# Patient Record
Sex: FEMALE | Race: WHITE | Employment: FULL TIME | URBAN - METROPOLITAN AREA
[De-identification: names, ages, dates, MRNs, and addresses within clinical notes are randomized per-mention and may not be internally consistent; named-entity substitution may affect disease eponyms.]

---

## 2017-01-12 DIAGNOSIS — F33.40 RECURRENT MAJOR DEPRESSIVE DISORDER, IN REMISSION (HCC): ICD-10-CM

## 2017-01-12 RX ORDER — VENLAFAXINE HYDROCHLORIDE 75 MG/1
CAPSULE, EXTENDED RELEASE ORAL
Qty: 270 CAPSULE | Refills: 3 | Status: SHIPPED | OUTPATIENT
Start: 2017-01-12 | End: 2017-01-25 | Stop reason: CLARIF

## 2017-01-16 ENCOUNTER — TELEPHONE (OUTPATIENT)
Dept: FAMILY MEDICINE CLINIC | Age: 42
End: 2017-01-16

## 2017-01-25 ENCOUNTER — TELEPHONE (OUTPATIENT)
Dept: FAMILY MEDICINE CLINIC | Age: 42
End: 2017-01-25

## 2017-01-25 RX ORDER — VENLAFAXINE HYDROCHLORIDE 150 MG/1
150 CAPSULE, EXTENDED RELEASE ORAL DAILY
Qty: 30 CAPSULE | Refills: 3 | Status: SHIPPED | OUTPATIENT
Start: 2017-01-25 | End: 2017-05-23 | Stop reason: SDUPTHER

## 2017-01-25 RX ORDER — VENLAFAXINE HYDROCHLORIDE 75 MG/1
75 CAPSULE, EXTENDED RELEASE ORAL DAILY
Qty: 30 CAPSULE | Refills: 3 | Status: SHIPPED | OUTPATIENT
Start: 2017-01-25 | End: 2017-04-26 | Stop reason: SDUPTHER

## 2017-05-23 RX ORDER — VENLAFAXINE HYDROCHLORIDE 150 MG/1
CAPSULE, EXTENDED RELEASE ORAL
Qty: 30 CAPSULE | Refills: 1 | Status: SHIPPED | OUTPATIENT
Start: 2017-05-23 | End: 2017-07-23 | Stop reason: SDUPTHER

## 2017-07-24 RX ORDER — VENLAFAXINE HYDROCHLORIDE 150 MG/1
CAPSULE, EXTENDED RELEASE ORAL
Qty: 30 CAPSULE | Refills: 1 | Status: SHIPPED | OUTPATIENT
Start: 2017-07-24 | End: 2017-09-24 | Stop reason: SDUPTHER

## 2017-09-25 RX ORDER — VENLAFAXINE HYDROCHLORIDE 150 MG/1
CAPSULE, EXTENDED RELEASE ORAL
Qty: 30 CAPSULE | Refills: 0 | Status: SHIPPED | OUTPATIENT
Start: 2017-09-25 | End: 2017-10-13 | Stop reason: SDUPTHER

## 2017-10-13 ENCOUNTER — OFFICE VISIT (OUTPATIENT)
Dept: FAMILY MEDICINE CLINIC | Age: 42
End: 2017-10-13

## 2017-10-13 VITALS
TEMPERATURE: 98.7 F | WEIGHT: 162 LBS | HEIGHT: 63 IN | SYSTOLIC BLOOD PRESSURE: 104 MMHG | DIASTOLIC BLOOD PRESSURE: 62 MMHG | BODY MASS INDEX: 28.7 KG/M2

## 2017-10-13 DIAGNOSIS — H92.02 EAR PAIN, LEFT: ICD-10-CM

## 2017-10-13 DIAGNOSIS — F33.40 RECURRENT MAJOR DEPRESSIVE DISORDER, IN REMISSION (HCC): Primary | ICD-10-CM

## 2017-10-13 DIAGNOSIS — H60.392 INFECTION OF LEFT EARLOBE: ICD-10-CM

## 2017-10-13 PROCEDURE — 99214 OFFICE O/P EST MOD 30 MIN: CPT | Performed by: FAMILY MEDICINE

## 2017-10-13 RX ORDER — VENLAFAXINE HYDROCHLORIDE 150 MG/1
CAPSULE, EXTENDED RELEASE ORAL
Qty: 90 CAPSULE | Refills: 1 | Status: SHIPPED | OUTPATIENT
Start: 2017-10-13 | End: 2018-05-10 | Stop reason: SDUPTHER

## 2017-10-13 RX ORDER — SULFAMETHOXAZOLE AND TRIMETHOPRIM 800; 160 MG/1; MG/1
1 TABLET ORAL 2 TIMES DAILY
Qty: 14 TABLET | Refills: 0 | Status: SHIPPED | OUTPATIENT
Start: 2017-10-13 | End: 2017-10-20

## 2017-10-13 RX ORDER — VENLAFAXINE HYDROCHLORIDE 75 MG/1
CAPSULE, EXTENDED RELEASE ORAL
Qty: 90 CAPSULE | Refills: 1 | Status: SHIPPED | OUTPATIENT
Start: 2017-10-13 | End: 2018-05-10 | Stop reason: SDUPTHER

## 2017-10-13 ASSESSMENT — PATIENT HEALTH QUESTIONNAIRE - PHQ9
SUM OF ALL RESPONSES TO PHQ9 QUESTIONS 1 & 2: 0
1. LITTLE INTEREST OR PLEASURE IN DOING THINGS: 0
2. FEELING DOWN, DEPRESSED OR HOPELESS: 0
SUM OF ALL RESPONSES TO PHQ QUESTIONS 1-9: 0

## 2017-10-13 NOTE — PROGRESS NOTES
Noah Dewey is a 36 y.o. female    Chief Complaint   Patient presents with    Depression     med follow up     Ear Drainage    Otalgia     left ear        HPI:    Otalgia    There is pain in the left ear. This is a recurrent problem. The current episode started 1 to 4 weeks ago. The problem has been gradually worsening. There has been no fever. She has tried NSAIDs for the symptoms. The treatment provided moderate relief. There is no history of hearing loss. Patient has pain in her left earlobe. She occasionally wears earrings. Depression. This is a chronic condition. Her depression is well controlled. Patient has been on Effexor for several years. She takes both 75 mg 150 mg capsules. She used to see a psychiatrist but wanted to see the primary care physician. ROS:    Review of Systems   Constitutional: Negative for fever. HENT: Positive for ear pain. Psychiatric/Behavioral: Negative for depression (controlled with Effexor). /62 (Site: Left Arm, Position: Sitting, Cuff Size: Small Adult)   Temp 98.7 °F (37.1 °C) (Oral)   Ht 5' 3.39\" (1.61 m) Comment: without shoes  Wt 162 lb (73.5 kg) Comment: without out shoes  LMP 09/14/2017   Breastfeeding? No   BMI 28.35 kg/m²     Physical Exam:    Physical Exam   Constitutional: She appears well-developed. No distress. HENT:   Right Ear: Tympanic membrane normal.   Left Ear: Tympanic membrane normal. Tympanic membrane is not erythematous. Mild erythema to her left earlobe. Minimal TTP upon squeezing the left earlobe. Skin: She is not diaphoretic. Psychiatric: She has a normal mood and affect.  Her behavior is normal.       Current Outpatient Prescriptions   Medication Sig Dispense Refill    venlafaxine (EFFEXOR XR) 75 MG extended release capsule TAKE 1 CAPSULE BY MOUTH DAILY (ALONG WITH 150 MG CAPSULE) 90 capsule 1    venlafaxine (EFFEXOR XR) 150 MG extended release capsule TAKE 1 CAPSULE BY MOUTH DAILY (ALONG WITH 75 MG CAPSULE) 90 capsule 1    sulfamethoxazole-trimethoprim (BACTRIM DS) 800-160 MG per tablet Take 1 tablet by mouth 2 times daily for 7 days 14 tablet 0     No current facility-administered medications for this visit. Assessment:    1. Recurrent major depressive disorder, in remission ([facility] [address]    2. Infection of left earlobe    3. Ear pain, left        Plan:    1. Recurrent major depressive disorder, in remission ([facility] [address]  Stable. Continue current medications. - venlafaxine (EFFEXOR XR) 75 MG extended release capsule; TAKE 1 CAPSULE BY MOUTH DAILY (ALONG WITH 150 MG CAPSULE)  Dispense: 90 capsule; Refill: 1  - venlafaxine (EFFEXOR XR) 150 MG extended release capsule; TAKE 1 CAPSULE BY MOUTH DAILY (ALONG WITH 75 MG CAPSULE)  Dispense: 90 capsule; Refill: 1    2. Infection of left earlobe  Try applying ice TID. If no improvement, take Bactrim. May need to see ENT if she feels abscess is developing but I doubt that is the case currently. - sulfamethoxazole-trimethoprim (BACTRIM DS) 800-160 MG per tablet; Take 1 tablet by mouth 2 times daily for 7 days  Dispense: 14 tablet; Refill: 0    3. Ear pain, left  Inner ear looks good. Recommend continuing ibuprofen prn pain. Return in about 6 months (around 4/13/2018) for Preventative.

## 2018-05-10 DIAGNOSIS — F33.40 RECURRENT MAJOR DEPRESSIVE DISORDER, IN REMISSION (HCC): ICD-10-CM

## 2018-05-10 RX ORDER — VENLAFAXINE HYDROCHLORIDE 150 MG/1
CAPSULE, EXTENDED RELEASE ORAL
Qty: 90 CAPSULE | Refills: 1 | Status: SHIPPED | OUTPATIENT
Start: 2018-05-10 | End: 2018-11-07 | Stop reason: SDUPTHER

## 2018-05-10 RX ORDER — VENLAFAXINE HYDROCHLORIDE 75 MG/1
CAPSULE, EXTENDED RELEASE ORAL
Qty: 90 CAPSULE | Refills: 1 | Status: SHIPPED | OUTPATIENT
Start: 2018-05-10 | End: 2018-11-07 | Stop reason: SDUPTHER

## 2018-11-07 DIAGNOSIS — F33.40 RECURRENT MAJOR DEPRESSIVE DISORDER, IN REMISSION (HCC): ICD-10-CM

## 2018-11-09 RX ORDER — VENLAFAXINE HYDROCHLORIDE 75 MG/1
CAPSULE, EXTENDED RELEASE ORAL
Qty: 90 CAPSULE | Refills: 1 | Status: SHIPPED | OUTPATIENT
Start: 2018-11-09 | End: 2019-05-13 | Stop reason: SDUPTHER

## 2018-11-09 RX ORDER — VENLAFAXINE HYDROCHLORIDE 150 MG/1
CAPSULE, EXTENDED RELEASE ORAL
Qty: 90 CAPSULE | Refills: 1 | Status: SHIPPED | OUTPATIENT
Start: 2018-11-09 | End: 2019-05-13 | Stop reason: SDUPTHER

## 2019-05-13 DIAGNOSIS — F33.40 RECURRENT MAJOR DEPRESSIVE DISORDER, IN REMISSION (HCC): ICD-10-CM

## 2019-05-14 RX ORDER — VENLAFAXINE HYDROCHLORIDE 150 MG/1
CAPSULE, EXTENDED RELEASE ORAL
Qty: 90 CAPSULE | Refills: 1 | Status: SHIPPED | OUTPATIENT
Start: 2019-05-14 | End: 2019-12-12 | Stop reason: SDUPTHER

## 2019-05-14 RX ORDER — VENLAFAXINE HYDROCHLORIDE 75 MG/1
CAPSULE, EXTENDED RELEASE ORAL
Qty: 90 CAPSULE | Refills: 1 | Status: SHIPPED | OUTPATIENT
Start: 2019-05-14 | End: 2019-12-12 | Stop reason: SDUPTHER

## 2019-12-12 ENCOUNTER — OFFICE VISIT (OUTPATIENT)
Dept: FAMILY MEDICINE CLINIC | Age: 44
End: 2019-12-12
Payer: COMMERCIAL

## 2019-12-12 VITALS
WEIGHT: 177 LBS | HEART RATE: 64 BPM | SYSTOLIC BLOOD PRESSURE: 118 MMHG | BODY MASS INDEX: 30.22 KG/M2 | RESPIRATION RATE: 16 BRPM | HEIGHT: 64 IN | DIASTOLIC BLOOD PRESSURE: 70 MMHG | OXYGEN SATURATION: 98 %

## 2019-12-12 DIAGNOSIS — F32.A DEPRESSION, UNSPECIFIED DEPRESSION TYPE: ICD-10-CM

## 2019-12-12 DIAGNOSIS — Z00.00 PHYSICAL EXAM: ICD-10-CM

## 2019-12-12 DIAGNOSIS — Z23 NEED FOR INFLUENZA VACCINATION: ICD-10-CM

## 2019-12-12 DIAGNOSIS — Z12.39 SCREENING FOR BREAST CANCER: ICD-10-CM

## 2019-12-12 DIAGNOSIS — F40.10 SOCIAL ANXIETY DISORDER: ICD-10-CM

## 2019-12-12 DIAGNOSIS — E66.09 CLASS 1 OBESITY DUE TO EXCESS CALORIES WITHOUT SERIOUS COMORBIDITY WITH BODY MASS INDEX (BMI) OF 30.0 TO 30.9 IN ADULT: ICD-10-CM

## 2019-12-12 PROCEDURE — 90686 IIV4 VACC NO PRSV 0.5 ML IM: CPT | Performed by: NURSE PRACTITIONER

## 2019-12-12 PROCEDURE — 99396 PREV VISIT EST AGE 40-64: CPT | Performed by: NURSE PRACTITIONER

## 2019-12-12 PROCEDURE — 90471 IMMUNIZATION ADMIN: CPT | Performed by: NURSE PRACTITIONER

## 2019-12-12 RX ORDER — VENLAFAXINE HYDROCHLORIDE 150 MG/1
150 CAPSULE, EXTENDED RELEASE ORAL DAILY
Qty: 90 CAPSULE | Refills: 0 | Status: SHIPPED | OUTPATIENT
Start: 2019-12-12 | End: 2020-01-02 | Stop reason: SDUPTHER

## 2019-12-12 RX ORDER — VENLAFAXINE HYDROCHLORIDE 75 MG/1
75 CAPSULE, EXTENDED RELEASE ORAL DAILY
Qty: 90 CAPSULE | Refills: 0 | Status: SHIPPED | OUTPATIENT
Start: 2019-12-12 | End: 2020-01-02 | Stop reason: SDUPTHER

## 2019-12-12 RX ORDER — BUSPIRONE HYDROCHLORIDE 7.5 MG/1
7.5 TABLET ORAL 2 TIMES DAILY
Qty: 60 TABLET | Refills: 0 | Status: SHIPPED | OUTPATIENT
Start: 2019-12-12 | End: 2020-01-08 | Stop reason: SDUPTHER

## 2019-12-12 ASSESSMENT — PATIENT HEALTH QUESTIONNAIRE - PHQ9
SUM OF ALL RESPONSES TO PHQ QUESTIONS 1-9: 2
2. FEELING DOWN, DEPRESSED OR HOPELESS: 1
SUM OF ALL RESPONSES TO PHQ QUESTIONS 1-9: 2
SUM OF ALL RESPONSES TO PHQ9 QUESTIONS 1 & 2: 2
1. LITTLE INTEREST OR PLEASURE IN DOING THINGS: 1

## 2019-12-23 ENCOUNTER — TELEPHONE (OUTPATIENT)
Dept: FAMILY MEDICINE CLINIC | Age: 44
End: 2019-12-23

## 2020-01-02 ENCOUNTER — OFFICE VISIT (OUTPATIENT)
Dept: FAMILY MEDICINE CLINIC | Age: 45
End: 2020-01-02
Payer: COMMERCIAL

## 2020-01-02 VITALS
DIASTOLIC BLOOD PRESSURE: 82 MMHG | SYSTOLIC BLOOD PRESSURE: 122 MMHG | HEART RATE: 68 BPM | WEIGHT: 177 LBS | BODY MASS INDEX: 30.38 KG/M2 | OXYGEN SATURATION: 98 %

## 2020-01-02 PROCEDURE — 99213 OFFICE O/P EST LOW 20 MIN: CPT | Performed by: FAMILY MEDICINE

## 2020-01-02 RX ORDER — VENLAFAXINE HYDROCHLORIDE 150 MG/1
150 CAPSULE, EXTENDED RELEASE ORAL DAILY
Qty: 90 CAPSULE | Refills: 0 | Status: SHIPPED | OUTPATIENT
Start: 2020-01-02 | End: 2020-06-01

## 2020-01-02 RX ORDER — VENLAFAXINE HYDROCHLORIDE 75 MG/1
75 CAPSULE, EXTENDED RELEASE ORAL DAILY
Qty: 90 CAPSULE | Refills: 0 | Status: SHIPPED | OUTPATIENT
Start: 2020-01-02 | End: 2020-06-01

## 2020-01-09 RX ORDER — BUSPIRONE HYDROCHLORIDE 7.5 MG/1
7.5 TABLET ORAL 2 TIMES DAILY PRN
Qty: 60 TABLET | Refills: 1 | Status: SHIPPED | OUTPATIENT
Start: 2020-01-09 | End: 2020-01-24 | Stop reason: SDUPTHER

## 2020-01-24 RX ORDER — BUSPIRONE HYDROCHLORIDE 7.5 MG/1
7.5 TABLET ORAL 2 TIMES DAILY PRN
Qty: 180 TABLET | Refills: 1 | Status: SHIPPED | OUTPATIENT
Start: 2020-01-24 | End: 2020-08-13

## 2020-01-27 ENCOUNTER — HOSPITAL ENCOUNTER (OUTPATIENT)
Dept: WOMENS IMAGING | Age: 45
Discharge: HOME | End: 2020-01-27
Payer: COMMERCIAL

## 2020-01-27 PROCEDURE — 77063 BREAST TOMOSYNTHESIS BI: CPT

## 2020-06-01 RX ORDER — VENLAFAXINE HYDROCHLORIDE 75 MG/1
CAPSULE, EXTENDED RELEASE ORAL
Qty: 90 CAPSULE | Refills: 0 | Status: SHIPPED | OUTPATIENT
Start: 2020-06-01 | End: 2021-11-29 | Stop reason: SDUPTHER

## 2020-06-01 RX ORDER — VENLAFAXINE HYDROCHLORIDE 150 MG/1
CAPSULE, EXTENDED RELEASE ORAL
Qty: 90 CAPSULE | Refills: 0 | Status: SHIPPED | OUTPATIENT
Start: 2020-06-01 | End: 2020-09-08 | Stop reason: SDUPTHER

## 2020-06-01 NOTE — TELEPHONE ENCOUNTER
.  Last office visit 1/2/2020     Last written 1/2/20 #90 no refills    Next office visit scheduled 7/6/2020    Requested Prescriptions     Pending Prescriptions Disp Refills    venlafaxine (EFFEXOR XR) 75 MG extended release capsule [Pharmacy Med Name: VENLAFAXINE HCL ER 75 MG CAP] 90 capsule 0     Sig: TAKE 1 CAPSULE BY MOUTH EVERY DAY

## 2020-08-13 RX ORDER — BUSPIRONE HYDROCHLORIDE 7.5 MG/1
7.5 TABLET ORAL 2 TIMES DAILY PRN
Qty: 180 TABLET | Refills: 1 | Status: SHIPPED | OUTPATIENT
Start: 2020-08-13 | End: 2020-08-17

## 2020-08-17 ENCOUNTER — APPOINTMENT (OUTPATIENT)
Dept: CT IMAGING | Age: 45
End: 2020-08-17
Payer: COMMERCIAL

## 2020-08-17 ENCOUNTER — HOSPITAL ENCOUNTER (EMERGENCY)
Age: 45
Discharge: HOME | End: 2020-08-17
Attending: EMERGENCY MEDICINE
Payer: COMMERCIAL

## 2020-08-17 ENCOUNTER — APPOINTMENT (OUTPATIENT)
Dept: GENERAL RADIOLOGY | Age: 45
End: 2020-08-17
Payer: COMMERCIAL

## 2020-08-17 VITALS
RESPIRATION RATE: 12 BRPM | OXYGEN SATURATION: 97 % | HEART RATE: 67 BPM | WEIGHT: 170 LBS | SYSTOLIC BLOOD PRESSURE: 102 MMHG | DIASTOLIC BLOOD PRESSURE: 67 MMHG | TEMPERATURE: 98.6 F | BODY MASS INDEX: 30.12 KG/M2 | HEIGHT: 63 IN

## 2020-08-17 LAB
A/G RATIO: 1.9 (ref 1.1–2.2)
ALBUMIN SERPL-MCNC: 4.5 G/DL (ref 3.4–5)
ALP BLD-CCNC: 95 U/L (ref 40–129)
ALT SERPL-CCNC: 82 U/L (ref 10–40)
ANION GAP SERPL CALCULATED.3IONS-SCNC: 17 MMOL/L (ref 3–16)
AST SERPL-CCNC: 76 U/L (ref 15–37)
BASOPHILS ABSOLUTE: 0 K/UL (ref 0–0.2)
BASOPHILS RELATIVE PERCENT: 0.9 %
BILIRUB SERPL-MCNC: 0.3 MG/DL (ref 0–1)
BUN BLDV-MCNC: 11 MG/DL (ref 7–20)
CALCIUM SERPL-MCNC: 9.3 MG/DL (ref 8.3–10.6)
CHLORIDE BLD-SCNC: 99 MMOL/L (ref 99–110)
CO2: 20 MMOL/L (ref 21–32)
CREAT SERPL-MCNC: 0.6 MG/DL (ref 0.6–1.1)
EKG ATRIAL RATE: 76 BPM
EKG DIAGNOSIS: NORMAL
EKG P AXIS: 44 DEGREES
EKG P-R INTERVAL: 146 MS
EKG Q-T INTERVAL: 410 MS
EKG QRS DURATION: 90 MS
EKG QTC CALCULATION (BAZETT): 461 MS
EKG R AXIS: 60 DEGREES
EKG T AXIS: 21 DEGREES
EKG VENTRICULAR RATE: 76 BPM
EOSINOPHILS ABSOLUTE: 0 K/UL (ref 0–0.6)
EOSINOPHILS RELATIVE PERCENT: 0.8 %
GFR AFRICAN AMERICAN: >60
GFR NON-AFRICAN AMERICAN: >60
GLOBULIN: 2.4 G/DL
GLUCOSE BLD-MCNC: 97 MG/DL (ref 70–99)
HCG QUALITATIVE: NEGATIVE
HCT VFR BLD CALC: 39.1 % (ref 36–48)
HEMOGLOBIN: 13.4 G/DL (ref 12–16)
LYMPHOCYTES ABSOLUTE: 1.4 K/UL (ref 1–5.1)
LYMPHOCYTES RELATIVE PERCENT: 33.8 %
MCH RBC QN AUTO: 29.4 PG (ref 26–34)
MCHC RBC AUTO-ENTMCNC: 34.3 G/DL (ref 31–36)
MCV RBC AUTO: 85.9 FL (ref 80–100)
MONOCYTES ABSOLUTE: 0.3 K/UL (ref 0–1.3)
MONOCYTES RELATIVE PERCENT: 8.1 %
NEUTROPHILS ABSOLUTE: 2.4 K/UL (ref 1.7–7.7)
NEUTROPHILS RELATIVE PERCENT: 56.4 %
PDW BLD-RTO: 14.1 % (ref 12.4–15.4)
PLATELET # BLD: 153 K/UL (ref 135–450)
PLATELET SLIDE REVIEW: ADEQUATE
PMV BLD AUTO: 10.5 FL (ref 5–10.5)
POTASSIUM SERPL-SCNC: 4 MMOL/L (ref 3.5–5.1)
RBC # BLD: 4.55 M/UL (ref 4–5.2)
SLIDE REVIEW: NORMAL
SODIUM BLD-SCNC: 136 MMOL/L (ref 136–145)
TOTAL PROTEIN: 6.9 G/DL (ref 6.4–8.2)
TROPONIN: <0.01 NG/ML
WBC # BLD: 4.2 K/UL (ref 4–11)

## 2020-08-17 PROCEDURE — 80053 COMPREHEN METABOLIC PANEL: CPT

## 2020-08-17 PROCEDURE — 6360000002 HC RX W HCPCS: Performed by: EMERGENCY MEDICINE

## 2020-08-17 PROCEDURE — 70450 CT HEAD/BRAIN W/O DYE: CPT

## 2020-08-17 PROCEDURE — 71045 X-RAY EXAM CHEST 1 VIEW: CPT

## 2020-08-17 PROCEDURE — 96372 THER/PROPH/DIAG INJ SC/IM: CPT

## 2020-08-17 PROCEDURE — 6370000000 HC RX 637 (ALT 250 FOR IP): Performed by: EMERGENCY MEDICINE

## 2020-08-17 PROCEDURE — 99284 EMERGENCY DEPT VISIT MOD MDM: CPT

## 2020-08-17 PROCEDURE — 93005 ELECTROCARDIOGRAM TRACING: CPT | Performed by: EMERGENCY MEDICINE

## 2020-08-17 PROCEDURE — 84703 CHORIONIC GONADOTROPIN ASSAY: CPT

## 2020-08-17 PROCEDURE — 85025 COMPLETE CBC W/AUTO DIFF WBC: CPT

## 2020-08-17 PROCEDURE — 84484 ASSAY OF TROPONIN QUANT: CPT

## 2020-08-17 RX ORDER — BUTALBITAL, ACETAMINOPHEN AND CAFFEINE 50; 325; 40 MG/1; MG/1; MG/1
1 TABLET ORAL EVERY 4 HOURS PRN
Qty: 30 TABLET | Refills: 0 | Status: SHIPPED | OUTPATIENT
Start: 2020-08-17 | End: 2021-11-29 | Stop reason: ALTCHOICE

## 2020-08-17 RX ORDER — METOCLOPRAMIDE HYDROCHLORIDE 5 MG/ML
10 INJECTION INTRAMUSCULAR; INTRAVENOUS ONCE
Status: DISCONTINUED | OUTPATIENT
Start: 2020-08-17 | End: 2020-08-17

## 2020-08-17 RX ORDER — ONDANSETRON 4 MG/1
4 TABLET, ORALLY DISINTEGRATING ORAL ONCE
Status: COMPLETED | OUTPATIENT
Start: 2020-08-17 | End: 2020-08-17

## 2020-08-17 RX ORDER — 0.9 % SODIUM CHLORIDE 0.9 %
1000 INTRAVENOUS SOLUTION INTRAVENOUS ONCE
Status: DISCONTINUED | OUTPATIENT
Start: 2020-08-17 | End: 2020-08-17

## 2020-08-17 RX ORDER — SUMATRIPTAN 6 MG/.5ML
6 INJECTION, SOLUTION SUBCUTANEOUS ONCE
Status: COMPLETED | OUTPATIENT
Start: 2020-08-17 | End: 2020-08-17

## 2020-08-17 RX ORDER — ONDANSETRON 2 MG/ML
4 INJECTION INTRAMUSCULAR; INTRAVENOUS ONCE
Status: DISCONTINUED | OUTPATIENT
Start: 2020-08-17 | End: 2020-08-17

## 2020-08-17 RX ORDER — BUTALBITAL, ACETAMINOPHEN AND CAFFEINE 50; 325; 40 MG/1; MG/1; MG/1
1 TABLET ORAL EVERY 4 HOURS PRN
Status: DISCONTINUED | OUTPATIENT
Start: 2020-08-17 | End: 2020-08-17

## 2020-08-17 RX ORDER — METOCLOPRAMIDE HYDROCHLORIDE 5 MG/ML
10 INJECTION INTRAMUSCULAR; INTRAVENOUS ONCE
Status: COMPLETED | OUTPATIENT
Start: 2020-08-17 | End: 2020-08-17

## 2020-08-17 RX ORDER — ACETAMINOPHEN 500 MG
1000 TABLET ORAL ONCE
Status: COMPLETED | OUTPATIENT
Start: 2020-08-17 | End: 2020-08-17

## 2020-08-17 RX ORDER — IBUPROFEN 400 MG/1
400 TABLET ORAL ONCE
Status: COMPLETED | OUTPATIENT
Start: 2020-08-17 | End: 2020-08-17

## 2020-08-17 RX ORDER — ONDANSETRON 4 MG/1
TABLET, ORALLY DISINTEGRATING ORAL
Status: DISCONTINUED
Start: 2020-08-17 | End: 2020-08-17 | Stop reason: HOSPADM

## 2020-08-17 RX ORDER — KETOROLAC TROMETHAMINE 30 MG/ML
30 INJECTION, SOLUTION INTRAMUSCULAR; INTRAVENOUS ONCE
Status: DISCONTINUED | OUTPATIENT
Start: 2020-08-17 | End: 2020-08-17

## 2020-08-17 RX ORDER — IBUPROFEN 400 MG/1
400 TABLET ORAL EVERY 6 HOURS PRN
Qty: 20 TABLET | Refills: 0 | Status: SHIPPED | OUTPATIENT
Start: 2020-08-17

## 2020-08-17 RX ORDER — METOCLOPRAMIDE 10 MG/1
10 TABLET ORAL 3 TIMES DAILY PRN
Qty: 20 TABLET | Refills: 0 | Status: SHIPPED | OUTPATIENT
Start: 2020-08-17 | End: 2021-11-29 | Stop reason: ALTCHOICE

## 2020-08-17 RX ADMIN — SUMATRIPTAN 6 MG: 6 INJECTION SUBCUTANEOUS at 05:42

## 2020-08-17 RX ADMIN — METOCLOPRAMIDE HYDROCHLORIDE 10 MG: 5 INJECTION INTRAMUSCULAR; INTRAVENOUS at 05:00

## 2020-08-17 RX ADMIN — IBUPROFEN 400 MG: 400 TABLET, FILM COATED ORAL at 05:42

## 2020-08-17 RX ADMIN — ONDANSETRON 4 MG: 4 TABLET, ORALLY DISINTEGRATING ORAL at 03:32

## 2020-08-17 RX ADMIN — ACETAMINOPHEN 1000 MG: 500 TABLET ORAL at 03:32

## 2020-08-17 ASSESSMENT — PAIN DESCRIPTION - PAIN TYPE
TYPE: ACUTE PAIN
TYPE: ACUTE PAIN

## 2020-08-17 ASSESSMENT — PAIN SCALES - GENERAL
PAINLEVEL_OUTOF10: 2
PAINLEVEL_OUTOF10: 2
PAINLEVEL_OUTOF10: 5
PAINLEVEL_OUTOF10: 2

## 2020-08-17 ASSESSMENT — PAIN DESCRIPTION - LOCATION: LOCATION: CHEST

## 2020-08-17 NOTE — ED PROVIDER NOTES
[facility]  ED  [facility]      Pt Name: Fabian Stoll  MRN: [id number]  [place] 1975  Date of evaluation: 8/17/2020  Provider: Javier Townsend MD    CHIEF COMPLAINT       Chief Complaint   Patient presents with    Nausea     Has been dealing with a sinus issue for a week now about 3 days ago started dry heaving, tonight when she was dry heaving she started getting shooting pain across her chest, SOB, anxious at this time. HISTORY OF PRESENT ILLNESS   (Location/Symptom, Timing/Onset,Context/Setting, Quality, Duration, Modifying Factors, Severity)  Note limiting factors. Fabian Stoll is a 54 y.o. female who presents to the emergency department for throbbing frontal headache. Started approximately 5 days ago. Initially started as sinus symptoms including congestion which is since improved. The headache has persisted. The headache is been constant for the past week. Not associated with any neck stiff neck or neck pain. No fevers. It is not the worst headache in her life although she does not have frequent headaches. 3 days ago she started having nausea and dry heaving. Tonight when she coughed or sneezed she felt a burning pain across her epigastric region radiating to the back so that scared her and she decided to come to the emergency room. She feels very anxious. She does have a history of anxiety. The headache is rated at a 4/10. It is not worsened by light or sound. Nursing notes were reviewed. REVIEW OF SYSTEMS    (2-9 systems for level 4, 10 or more for level 5)     Review of Systems    Positive and pertinent negative as per HPI. Except as noted above in the ROS, all other systems were reviewed and were negative.     PAST MEDICAL HISTORY     Past Medical History:   Diagnosis Date    Anxiety     Depression     Strep sore throat          SURGICALHISTORY       Past Surgical History:   Procedure Laterality Date    BLADDER Narrative    Walks the dog leisurely pace, healthy diet, walks on treadmill       SCREENINGS             PHYSICAL EXAM    (up to 7 for level 4, 8 or more for level 5)     ED Triage Vitals [08/17/20 0144]   BP Temp Temp Source Pulse Resp SpO2 Height Weight   117/83 98.4 °F (36.9 °C) Oral 73 22 100 % 5' 3\" (1.6 m) 170 lb (77.1 kg)       Physical Exam  Vitals signs and nursing note reviewed. Constitutional:       Appearance: Normal appearance. She is well-developed. She is not ill-appearing. HENT:      Head: Normocephalic and atraumatic. Right Ear: External ear normal.      Left Ear: External ear normal.      Nose: Nose normal. No congestion or rhinorrhea. Mouth/Throat:      Mouth: Mucous membranes are moist.   Eyes:      General: No scleral icterus. Right eye: No discharge. Left eye: No discharge. Conjunctiva/sclera: Conjunctivae normal.   Neck:      Musculoskeletal: Neck supple. No neck rigidity or muscular tenderness. Cardiovascular:      Rate and Rhythm: Normal rate and regular rhythm. Heart sounds: Normal heart sounds. Pulmonary:      Effort: Pulmonary effort is normal. No respiratory distress. Breath sounds: Normal breath sounds. No wheezing or rales. Abdominal:      General: Bowel sounds are normal. There is no distension. Palpations: Abdomen is soft. Tenderness: There is no abdominal tenderness. Skin:     Coloration: Skin is not pale. Neurological:      General: No focal deficit present. Mental Status: She is alert. Psychiatric:      Comments: Anxious, tearful             DIAGNOSTIC RESULTS     EKG: All EKG's are interpreted by the Emergency Department Physician who either signs or Co-signs this chart in the absence of a cardiologist.    12 lead EKG shows Normal sinus rhythm, NH interval QRS QTC normal.  Normal axis. No acute ischemic changes. No previous for comparison.     RADIOLOGY:   Non-plain film images such as CT, Ultrasound and MRI [address]  [facility]  [phone]    Schedule an appointment as soon as possible for a visit         DISCHARGEMEDICATIONS:  New Prescriptions    BUTALBITAL-ACETAMINOPHEN-CAFFEINE (FIORICET, ESGIC) -40 MG PER TABLET    Take 1 tablet by mouth every 4 hours as needed for Headaches    IBUPROFEN (ADVIL;MOTRIN) 400 MG TABLET    Take 1 tablet by mouth every 6 hours as needed for Pain    METOCLOPRAMIDE (REGLAN) 10 MG TABLET    Take 1 tablet by mouth 3 times daily as needed (nausea)          (Please note that portions of this note were completed with a voice recognition program.  Efforts were made to edit the dictations but occasionally words are mis-transcribed.)    Alyssia Potter MD (electronically signed)  Attending Emergency Physician        Alyssia Potter MD  08/17/20 8922

## 2020-09-08 RX ORDER — VENLAFAXINE HYDROCHLORIDE 150 MG/1
CAPSULE, EXTENDED RELEASE ORAL
Qty: 90 CAPSULE | Refills: 0 | Status: SHIPPED | OUTPATIENT
Start: 2020-09-08 | End: 2020-12-08 | Stop reason: SDUPTHER

## 2020-09-08 NOTE — TELEPHONE ENCOUNTER
Last office visit 1/2/2020     Last written     Next office visit scheduled None scheduled.       Requested Prescriptions     Pending Prescriptions Disp Refills    venlafaxine (EFFEXOR XR) 150 MG extended release capsule 90 capsule 0     Sig: TAKE 1 CAPSULE BY MOUTH EVERY DAY

## 2020-12-07 NOTE — TELEPHONE ENCOUNTER
Last office visit 1/2/2020     Last written 9-8-2020    Next office visit scheduled  The scheduled    Requested Prescriptions     Pending Prescriptions Disp Refills    venlafaxine (EFFEXOR XR) 150 MG extended release capsule 90 capsule 0     Sig: TAKE 1 CAPSULE BY MOUTH EVERY DAY

## 2020-12-08 RX ORDER — VENLAFAXINE HYDROCHLORIDE 150 MG/1
CAPSULE, EXTENDED RELEASE ORAL
Qty: 90 CAPSULE | Refills: 0 | Status: SHIPPED | OUTPATIENT
Start: 2020-12-08 | End: 2021-03-09

## 2021-03-08 NOTE — TELEPHONE ENCOUNTER
Refill Request     Last Seen: 1/2/2020    Last Written: 12/08/2020 #90 with 0 refills     Next Appointment:   No future appointments.     Patient was due for an appointment 07/02/2020     Requested Prescriptions     Pending Prescriptions Disp Refills    venlafaxine (EFFEXOR XR) 150 MG extended release capsule [Pharmacy Med Name: VENLAFAXINE HCL  MG CAP] 90 capsule 0     Sig: TAKE 1 CAPSULE BY MOUTH EVERY DAY

## 2021-03-09 RX ORDER — VENLAFAXINE HYDROCHLORIDE 150 MG/1
CAPSULE, EXTENDED RELEASE ORAL
Qty: 90 CAPSULE | Refills: 0 | Status: SHIPPED | OUTPATIENT
Start: 2021-03-09 | End: 2021-07-14

## 2021-03-29 ENCOUNTER — TELEPHONE (OUTPATIENT)
Dept: WOMENS IMAGING | Age: 46
End: 2021-03-29

## 2021-03-29 NOTE — TELEPHONE ENCOUNTER
Left a message for the patient- trying to schedule an appointment for OVERDUE screening mammogram. Phone number was provided- patient to contact scheduling.

## 2021-07-14 RX ORDER — VENLAFAXINE HYDROCHLORIDE 150 MG/1
CAPSULE, EXTENDED RELEASE ORAL
Qty: 90 CAPSULE | Refills: 0 | Status: SHIPPED | OUTPATIENT
Start: 2021-07-14 | End: 2021-10-18 | Stop reason: SDUPTHER

## 2021-07-14 NOTE — TELEPHONE ENCOUNTER
Please schedule. Last note: Return in about 6 months (around 7/2/2020) for Mood disorder.  Thanks, Melba Look

## 2021-07-14 NOTE — TELEPHONE ENCOUNTER
Refill Request     Last Seen: Last Seen Department: 1/2/2020  Last Seen by PCP: 1/2/2020    Last Written: 3/9/2021    Next Appointment:   No future appointments.         Requested Prescriptions     Pending Prescriptions Disp Refills    venlafaxine (EFFEXOR XR) 150 MG extended release capsule [Pharmacy Med Name: VENLAFAXINE HCL  MG CAP] 90 capsule 0     Sig: TAKE 1 CAPSULE BY MOUTH EVERY DAY

## 2021-10-18 RX ORDER — VENLAFAXINE HYDROCHLORIDE 150 MG/1
CAPSULE, EXTENDED RELEASE ORAL
Qty: 90 CAPSULE | Refills: 0 | Status: SHIPPED | OUTPATIENT
Start: 2021-10-18 | End: 2021-11-29 | Stop reason: SDUPTHER

## 2021-10-18 NOTE — TELEPHONE ENCOUNTER
.  Refill Request     Last Seen: Last Seen Department: 1/2/2020  Last Seen by PCP: 1/2/2020    Last Written: 7-14-21 90 with 0     Next Appointment:   Future Appointments   Date Time Provider Damaris Addison   11/29/2021 10:45 AM DO BRADLEY [place] - [facility]       Future appt scheduled       Requested Prescriptions     Pending Prescriptions Disp Refills    venlafaxine (EFFEXOR XR) 150 MG extended release capsule 90 capsule 0     Sig: Take 1 capsule by mouth daily

## 2021-10-18 NOTE — TELEPHONE ENCOUNTER
Does she want a move that appointment to a 30-minute physical spot later on in the day. That would be more appropriate.  Any 30 min spot can be used for the physical.

## 2021-11-29 ENCOUNTER — OFFICE VISIT (OUTPATIENT)
Dept: FAMILY MEDICINE CLINIC | Age: 46
End: 2021-11-29
Payer: COMMERCIAL

## 2021-11-29 VITALS
HEART RATE: 78 BPM | WEIGHT: 170 LBS | SYSTOLIC BLOOD PRESSURE: 116 MMHG | BODY MASS INDEX: 30.11 KG/M2 | OXYGEN SATURATION: 97 % | DIASTOLIC BLOOD PRESSURE: 78 MMHG

## 2021-11-29 DIAGNOSIS — Z11.59 NEED FOR HEPATITIS C SCREENING TEST: ICD-10-CM

## 2021-11-29 DIAGNOSIS — Z13.220 SCREENING FOR LIPID DISORDERS: ICD-10-CM

## 2021-11-29 DIAGNOSIS — F41.1 GAD (GENERALIZED ANXIETY DISORDER): ICD-10-CM

## 2021-11-29 DIAGNOSIS — Z00.00 PREVENTATIVE HEALTH CARE: Primary | ICD-10-CM

## 2021-11-29 DIAGNOSIS — Z20.822 CLOSE EXPOSURE TO COVID-19 VIRUS: ICD-10-CM

## 2021-11-29 DIAGNOSIS — Z00.00 PREVENTATIVE HEALTH CARE: ICD-10-CM

## 2021-11-29 DIAGNOSIS — F33.2 SEVERE EPISODE OF RECURRENT MAJOR DEPRESSIVE DISORDER, WITHOUT PSYCHOTIC FEATURES (HCC): ICD-10-CM

## 2021-11-29 DIAGNOSIS — E55.9 VITAMIN D DEFICIENCY: ICD-10-CM

## 2021-11-29 DIAGNOSIS — Z12.11 SCREENING FOR COLON CANCER: ICD-10-CM

## 2021-11-29 PROCEDURE — G8484 FLU IMMUNIZE NO ADMIN: HCPCS | Performed by: FAMILY MEDICINE

## 2021-11-29 PROCEDURE — 99396 PREV VISIT EST AGE 40-64: CPT | Performed by: FAMILY MEDICINE

## 2021-11-29 RX ORDER — VENLAFAXINE HYDROCHLORIDE 75 MG/1
CAPSULE, EXTENDED RELEASE ORAL
Qty: 90 CAPSULE | Refills: 3 | Status: SHIPPED | OUTPATIENT
Start: 2021-11-29

## 2021-11-29 RX ORDER — VENLAFAXINE HYDROCHLORIDE 150 MG/1
CAPSULE, EXTENDED RELEASE ORAL
Qty: 90 CAPSULE | Refills: 3 | Status: SHIPPED | OUTPATIENT
Start: 2021-11-29 | End: 2022-08-24 | Stop reason: SDUPTHER

## 2021-11-29 RX ORDER — BUSPIRONE HYDROCHLORIDE 10 MG/1
10-15 TABLET ORAL 3 TIMES DAILY PRN
Qty: 30 TABLET | Refills: 5 | Status: SHIPPED | OUTPATIENT
Start: 2021-11-29

## 2021-11-29 SDOH — ECONOMIC STABILITY: FOOD INSECURITY: WITHIN THE PAST 12 MONTHS, YOU WORRIED THAT YOUR FOOD WOULD RUN OUT BEFORE YOU GOT MONEY TO BUY MORE.: NEVER TRUE

## 2021-11-29 SDOH — ECONOMIC STABILITY: FOOD INSECURITY: WITHIN THE PAST 12 MONTHS, THE FOOD YOU BOUGHT JUST DIDN'T LAST AND YOU DIDN'T HAVE MONEY TO GET MORE.: NEVER TRUE

## 2021-11-29 ASSESSMENT — SOCIAL DETERMINANTS OF HEALTH (SDOH): HOW HARD IS IT FOR YOU TO PAY FOR THE VERY BASICS LIKE FOOD, HOUSING, MEDICAL CARE, AND HEATING?: NOT HARD AT ALL

## 2021-11-29 NOTE — PROGRESS NOTES
Ilda Soto is a 51 y.o. female    Chief Complaint   Patient presents with    Annual Exam       HPI:    HPI    Preventative care. Tdap: UTD  Flu shot declined  Unsure about Covid shot. She will see her gynecologist for the Pap smear. Cologuard preferred for colon cancer screening. Anxiety and depression. These are chronic issues. She thinks the Effexor is helping. Somehow the dose was decreased to 150 mg but she would like to go back to 225 mg. No longer working nights. [employer] results reviewed and Effexor is appropriate. Sleep is good. ROS:    Review of Systems   Psychiatric/Behavioral: Negative for sleep disturbance. /78   Pulse 78   Wt 170 lb (77.1 kg)   SpO2 97%   BMI 30.11 kg/m²     Physical Exam:    Physical Exam  Constitutional:       General: She is not in acute distress. Appearance: She is well-developed. She is not toxic-appearing. HENT:      Head: Normocephalic. Cardiovascular:      Rate and Rhythm: Normal rate and regular rhythm. Pulses: Normal pulses. Heart sounds: No murmur heard. Pulmonary:      Effort: Pulmonary effort is normal. No respiratory distress. Breath sounds: Normal breath sounds. No wheezing. Musculoskeletal:      Cervical back: Normal range of motion. No rigidity. Lymphadenopathy:      Cervical: No cervical adenopathy. Neurological:      Mental Status: She is alert. Psychiatric:         Mood and Affect: Mood normal.         Behavior: Behavior normal.         Thought Content:  Thought content normal.         Current Outpatient Medications   Medication Sig Dispense Refill    venlafaxine (EFFEXOR XR) 150 MG extended release capsule Take 1 capsule by mouth daily 90 capsule 3    venlafaxine (EFFEXOR XR) 75 MG extended release capsule TAKE 1 CAPSULE BY MOUTH EVERY DAY 90 capsule 3    busPIRone (BUSPAR) 10 MG tablet Take 1-1.5 tablets by mouth 3 times daily as needed (anxiety) 30 tablet 5    ibuprofen (ADVIL;MOTRIN) 400 MG tablet Take 1 tablet by mouth every 6 hours as needed for Pain 20 tablet 0    levonorgestrel (MIRENA, 52 MG,) IUD 52 mg 1 each by Intrauterine route once       No current facility-administered medications for this visit. Assessment:    1. Preventative health care    2. Severe episode of recurrent major depressive disorder, without psychotic features ([facility] [address]    3. ANAHI (generalized anxiety disorder)    4. Screening for lipid disorders    5. Vitamin D deficiency    6. Need for hepatitis C screening test    7. Screening for colon cancer    8. Close exposure to COVID-19 virus        Plan:    1. Preventative health care  - CBC Auto Differential; Future  - Comprehensive Metabolic Panel; Future    2. Severe episode of recurrent major depressive disorder, without psychotic features ([facility] [address]  Resume 225 mg daily. - venlafaxine (EFFEXOR XR) 150 MG extended release capsule; Take 1 capsule by mouth daily  Dispense: 90 capsule; Refill: 3  - venlafaxine (EFFEXOR XR) 75 MG extended release capsule; TAKE 1 CAPSULE BY MOUTH EVERY DAY  Dispense: 90 capsule; Refill: 3  - Vitamin B12 & Folate; Future  - TSH with Reflex; Future    3. ANAHI (generalized anxiety disorder)  Add back on the BuSpar but increase the dose. Discussed the side effects.  - venlafaxine (EFFEXOR XR) 150 MG extended release capsule; Take 1 capsule by mouth daily  Dispense: 90 capsule; Refill: 3  - venlafaxine (EFFEXOR XR) 75 MG extended release capsule; TAKE 1 CAPSULE BY MOUTH EVERY DAY  Dispense: 90 capsule; Refill: 3  - busPIRone (BUSPAR) 10 MG tablet; Take 1-1.5 tablets by mouth 3 times daily as needed (anxiety)  Dispense: 30 tablet; Refill: 5    4. Screening for lipid disorders  - Lipid Panel; Future    5. Vitamin D deficiency  - Vitamin D 25 Hydroxy; Future    6. Need for hepatitis C screening test  - Hepatitis C Antibody; Future    7. Screening for colon cancer  - Cologuard; Future    8.  Close exposure to COVID-19 virus  - Covid-19, Antibody, Total; Future      Return in about 1 year (around 11/29/2022) for Preventative.

## 2021-11-30 LAB
A/G RATIO: 2 (ref 1.1–2.2)
ALBUMIN SERPL-MCNC: 4.8 G/DL (ref 3.4–5)
ALP BLD-CCNC: 80 U/L (ref 40–129)
ALT SERPL-CCNC: 17 U/L (ref 10–40)
ANION GAP SERPL CALCULATED.3IONS-SCNC: 17 MMOL/L (ref 3–16)
AST SERPL-CCNC: 13 U/L (ref 15–37)
BASOPHILS ABSOLUTE: 0.1 K/UL (ref 0–0.2)
BASOPHILS RELATIVE PERCENT: 1.4 %
BILIRUB SERPL-MCNC: 0.4 MG/DL (ref 0–1)
BUN BLDV-MCNC: 10 MG/DL (ref 7–20)
CALCIUM SERPL-MCNC: 9.1 MG/DL (ref 8.3–10.6)
CHLORIDE BLD-SCNC: 102 MMOL/L (ref 99–110)
CHOLESTEROL, TOTAL: 189 MG/DL (ref 0–199)
CO2: 18 MMOL/L (ref 21–32)
CREAT SERPL-MCNC: 0.6 MG/DL (ref 0.6–1.1)
EOSINOPHILS ABSOLUTE: 0.1 K/UL (ref 0–0.6)
EOSINOPHILS RELATIVE PERCENT: 2.2 %
FOLATE: 8.4 NG/ML (ref 4.78–24.2)
GFR AFRICAN AMERICAN: >60
GFR NON-AFRICAN AMERICAN: >60
GLUCOSE BLD-MCNC: 101 MG/DL (ref 70–99)
HCT VFR BLD CALC: 43 % (ref 36–48)
HDLC SERPL-MCNC: 43 MG/DL (ref 40–60)
HEMOGLOBIN: 14 G/DL (ref 12–16)
HEPATITIS C ANTIBODY INTERPRETATION: NORMAL
LDL CHOLESTEROL CALCULATED: 124 MG/DL
LYMPHOCYTES ABSOLUTE: 1.4 K/UL (ref 1–5.1)
LYMPHOCYTES RELATIVE PERCENT: 25.4 %
MCH RBC QN AUTO: 28.1 PG (ref 26–34)
MCHC RBC AUTO-ENTMCNC: 32.6 G/DL (ref 31–36)
MCV RBC AUTO: 86.2 FL (ref 80–100)
MONOCYTES ABSOLUTE: 0.4 K/UL (ref 0–1.3)
MONOCYTES RELATIVE PERCENT: 7.1 %
NEUTROPHILS ABSOLUTE: 3.6 K/UL (ref 1.7–7.7)
NEUTROPHILS RELATIVE PERCENT: 63.9 %
PDW BLD-RTO: 14.5 % (ref 12.4–15.4)
PLATELET # BLD: 220 K/UL (ref 135–450)
PMV BLD AUTO: 10.1 FL (ref 5–10.5)
POTASSIUM SERPL-SCNC: 4.4 MMOL/L (ref 3.5–5.1)
RBC # BLD: 4.98 M/UL (ref 4–5.2)
SARS-COV-2 ANTIBODY, TOTAL: POSITIVE
SODIUM BLD-SCNC: 137 MMOL/L (ref 136–145)
TOTAL PROTEIN: 7.2 G/DL (ref 6.4–8.2)
TRIGL SERPL-MCNC: 111 MG/DL (ref 0–150)
TSH REFLEX: 2.57 UIU/ML (ref 0.27–4.2)
VITAMIN B-12: 449 PG/ML (ref 211–911)
VITAMIN D 25-HYDROXY: 14.9 NG/ML
VLDLC SERPL CALC-MCNC: 22 MG/DL
WBC # BLD: 5.7 K/UL (ref 4–11)

## 2022-07-31 ENCOUNTER — HOSPITAL ENCOUNTER (EMERGENCY)
Age: 47
Discharge: HOME | End: 2022-07-31
Payer: COMMERCIAL

## 2022-07-31 ENCOUNTER — APPOINTMENT (OUTPATIENT)
Dept: ULTRASOUND IMAGING | Age: 47
End: 2022-07-31
Payer: COMMERCIAL

## 2022-07-31 ENCOUNTER — APPOINTMENT (OUTPATIENT)
Dept: GENERAL RADIOLOGY | Age: 47
End: 2022-07-31
Payer: COMMERCIAL

## 2022-07-31 VITALS
SYSTOLIC BLOOD PRESSURE: 130 MMHG | TEMPERATURE: 98 F | BODY MASS INDEX: 28.17 KG/M2 | RESPIRATION RATE: 18 BRPM | WEIGHT: 165 LBS | HEIGHT: 64 IN | HEART RATE: 70 BPM | OXYGEN SATURATION: 100 % | DIASTOLIC BLOOD PRESSURE: 71 MMHG

## 2022-07-31 DIAGNOSIS — R10.11 ABDOMINAL PAIN, RIGHT UPPER QUADRANT: Primary | ICD-10-CM

## 2022-07-31 LAB
A/G RATIO: 2.5 (ref 1.1–2.2)
ALBUMIN SERPL-MCNC: 4.7 G/DL (ref 3.4–5)
ALP BLD-CCNC: 71 U/L (ref 40–129)
ALT SERPL-CCNC: 7 U/L (ref 10–40)
ANION GAP SERPL CALCULATED.3IONS-SCNC: 12 MMOL/L (ref 3–16)
AST SERPL-CCNC: 10 U/L (ref 15–37)
BASOPHILS ABSOLUTE: 0 K/UL (ref 0–0.2)
BASOPHILS RELATIVE PERCENT: 0.5 %
BILIRUB SERPL-MCNC: 0.4 MG/DL (ref 0–1)
BILIRUBIN URINE: NEGATIVE
BLOOD, URINE: NEGATIVE
BUN BLDV-MCNC: 9 MG/DL (ref 7–20)
CALCIUM SERPL-MCNC: 9.3 MG/DL (ref 8.3–10.6)
CHLORIDE BLD-SCNC: 102 MMOL/L (ref 99–110)
CLARITY: CLEAR
CO2: 21 MMOL/L (ref 21–32)
COLOR: ABNORMAL
CREAT SERPL-MCNC: 0.7 MG/DL (ref 0.6–1.1)
EOSINOPHILS ABSOLUTE: 1 K/UL (ref 0–0.6)
EOSINOPHILS RELATIVE PERCENT: 13.1 %
GFR AFRICAN AMERICAN: >60
GFR NON-AFRICAN AMERICAN: >60
GLUCOSE BLD-MCNC: 127 MG/DL (ref 70–99)
GLUCOSE URINE: NEGATIVE MG/DL
GONADOTROPIN, CHORIONIC (HCG) QUANT: <5 MIU/ML
HCT VFR BLD CALC: 40 % (ref 36–48)
HEMOGLOBIN: 13.6 G/DL (ref 12–16)
KETONES, URINE: NEGATIVE MG/DL
LEUKOCYTE ESTERASE, URINE: NEGATIVE
LIPASE: 57 U/L (ref 13–60)
LYMPHOCYTES ABSOLUTE: 1.5 K/UL (ref 1–5.1)
LYMPHOCYTES RELATIVE PERCENT: 20 %
MCH RBC QN AUTO: 29.1 PG (ref 26–34)
MCHC RBC AUTO-ENTMCNC: 33.9 G/DL (ref 31–36)
MCV RBC AUTO: 85.9 FL (ref 80–100)
MICROSCOPIC EXAMINATION: ABNORMAL
MONOCYTES ABSOLUTE: 0.4 K/UL (ref 0–1.3)
MONOCYTES RELATIVE PERCENT: 5.7 %
NEUTROPHILS ABSOLUTE: 4.6 K/UL (ref 1.7–7.7)
NEUTROPHILS RELATIVE PERCENT: 60.7 %
NITRITE, URINE: NEGATIVE
PDW BLD-RTO: 14 % (ref 12.4–15.4)
PH UA: 8.5 (ref 5–8)
PLATELET # BLD: 174 K/UL (ref 135–450)
PMV BLD AUTO: 9.4 FL (ref 5–10.5)
POTASSIUM SERPL-SCNC: 3.8 MMOL/L (ref 3.5–5.1)
PROTEIN UA: NEGATIVE MG/DL
RBC # BLD: 4.66 M/UL (ref 4–5.2)
SODIUM BLD-SCNC: 135 MMOL/L (ref 136–145)
SPECIFIC GRAVITY UA: 1.01 (ref 1–1.03)
TOTAL PROTEIN: 6.6 G/DL (ref 6.4–8.2)
TROPONIN: <0.01 NG/ML
URINE TYPE: ABNORMAL
UROBILINOGEN, URINE: 2 E.U./DL
WBC # BLD: 7.5 K/UL (ref 4–11)

## 2022-07-31 PROCEDURE — 84702 CHORIONIC GONADOTROPIN TEST: CPT

## 2022-07-31 PROCEDURE — 71045 X-RAY EXAM CHEST 1 VIEW: CPT

## 2022-07-31 PROCEDURE — 83690 ASSAY OF LIPASE: CPT

## 2022-07-31 PROCEDURE — 6360000002 HC RX W HCPCS: Performed by: PHYSICIAN ASSISTANT

## 2022-07-31 PROCEDURE — 99285 EMERGENCY DEPT VISIT HI MDM: CPT

## 2022-07-31 PROCEDURE — 84484 ASSAY OF TROPONIN QUANT: CPT

## 2022-07-31 PROCEDURE — 96375 TX/PRO/DX INJ NEW DRUG ADDON: CPT

## 2022-07-31 PROCEDURE — 96374 THER/PROPH/DIAG INJ IV PUSH: CPT

## 2022-07-31 PROCEDURE — 80053 COMPREHEN METABOLIC PANEL: CPT

## 2022-07-31 PROCEDURE — 85025 COMPLETE CBC W/AUTO DIFF WBC: CPT

## 2022-07-31 PROCEDURE — 2580000003 HC RX 258: Performed by: PHYSICIAN ASSISTANT

## 2022-07-31 PROCEDURE — 81003 URINALYSIS AUTO W/O SCOPE: CPT

## 2022-07-31 PROCEDURE — 93005 ELECTROCARDIOGRAM TRACING: CPT | Performed by: PHYSICIAN ASSISTANT

## 2022-07-31 PROCEDURE — 76705 ECHO EXAM OF ABDOMEN: CPT

## 2022-07-31 PROCEDURE — 6370000000 HC RX 637 (ALT 250 FOR IP): Performed by: PHYSICIAN ASSISTANT

## 2022-07-31 RX ORDER — ONDANSETRON 4 MG/1
4 TABLET, ORALLY DISINTEGRATING ORAL EVERY 8 HOURS PRN
Qty: 20 TABLET | Refills: 0 | Status: SHIPPED | OUTPATIENT
Start: 2022-07-31

## 2022-07-31 RX ORDER — FAMOTIDINE 20 MG/1
20 TABLET, FILM COATED ORAL 2 TIMES DAILY
Qty: 60 TABLET | Refills: 3 | Status: SHIPPED | OUTPATIENT
Start: 2022-07-31

## 2022-07-31 RX ORDER — 0.9 % SODIUM CHLORIDE 0.9 %
1000 INTRAVENOUS SOLUTION INTRAVENOUS ONCE
Status: COMPLETED | OUTPATIENT
Start: 2022-07-31 | End: 2022-07-31

## 2022-07-31 RX ORDER — HYDROCODONE BITARTRATE AND ACETAMINOPHEN 7.5; 325 MG/1; MG/1
1 TABLET ORAL ONCE
Status: COMPLETED | OUTPATIENT
Start: 2022-07-31 | End: 2022-07-31

## 2022-07-31 RX ORDER — KETOROLAC TROMETHAMINE 30 MG/ML
30 INJECTION, SOLUTION INTRAMUSCULAR; INTRAVENOUS ONCE
Status: COMPLETED | OUTPATIENT
Start: 2022-07-31 | End: 2022-07-31

## 2022-07-31 RX ORDER — ONDANSETRON 2 MG/ML
4 INJECTION INTRAMUSCULAR; INTRAVENOUS ONCE
Status: COMPLETED | OUTPATIENT
Start: 2022-07-31 | End: 2022-07-31

## 2022-07-31 RX ORDER — TRAMADOL HYDROCHLORIDE 50 MG/1
50 TABLET ORAL EVERY 6 HOURS PRN
Qty: 10 TABLET | Refills: 0 | Status: SHIPPED | OUTPATIENT
Start: 2022-07-31 | End: 2022-08-03

## 2022-07-31 RX ADMIN — ONDANSETRON 4 MG: 2 INJECTION INTRAMUSCULAR; INTRAVENOUS at 13:46

## 2022-07-31 RX ADMIN — KETOROLAC TROMETHAMINE 30 MG: 30 INJECTION, SOLUTION INTRAMUSCULAR; INTRAVENOUS at 13:46

## 2022-07-31 RX ADMIN — HYDROCODONE BITARTRATE AND ACETAMINOPHEN 1 TABLET: 7.5; 325 TABLET ORAL at 17:14

## 2022-07-31 RX ADMIN — SODIUM CHLORIDE 1000 ML: 9 INJECTION, SOLUTION INTRAVENOUS at 15:36

## 2022-07-31 ASSESSMENT — PAIN SCALES - GENERAL
PAINLEVEL_OUTOF10: 6
PAINLEVEL_OUTOF10: 4
PAINLEVEL_OUTOF10: 6
PAINLEVEL_OUTOF10: 5

## 2022-07-31 ASSESSMENT — PAIN - FUNCTIONAL ASSESSMENT: PAIN_FUNCTIONAL_ASSESSMENT: 0-10

## 2022-07-31 ASSESSMENT — PAIN DESCRIPTION - LOCATION
LOCATION: ABDOMEN
LOCATION: ABDOMEN

## 2022-07-31 ASSESSMENT — PAIN DESCRIPTION - DESCRIPTORS: DESCRIPTORS: ACHING;CRAMPING

## 2022-07-31 ASSESSMENT — PAIN DESCRIPTION - ORIENTATION: ORIENTATION: RIGHT;UPPER

## 2022-07-31 NOTE — ED PROVIDER NOTES
[facility] Emergency Department    CHIEF COMPLAINT  Abdominal Pain      SHARED SERVICE VISIT  Evaluated by PATRICIA. My supervising physician was available for consultation. HISTORY OF PRESENT ILLNESS  Rosibel Obando is a 49 y.o. female who presents to the ED complaining of 3 to 4-day history of abdominal pain. Patient dropped off by stephani for evaluation. Patient reports that for the past 3 to 4 days she has had some right upper quadrant pain which appears to get worse approximately 1 hour after eating and last for several hours before resolving. Described as sharp cramping sensation. Found no obvious alleviating factors. Reports associated nausea without vomiting. No diarrhea or constipation. No black or bloody stools. She denies fevers chills. No chest pain shortness of breath. Denies urinary symptoms. Occasional alcohol use without any recently. Denies abdominal surgeries. States that she does have the IUD in place. Has no vaginal bleeding, pain or discharge. No other complaints, modifying factors or associated symptoms. Nursing notes reviewed. Past Medical History:   Diagnosis Date    Anxiety     Depression     Strep sore throat      Past Surgical History:   Procedure Laterality Date    BLADDER SUSPENSION      COLONOSCOPY  6-1-15    no polyps.  normal.    COSMETIC SURGERY       Family History   Problem Relation Age of Onset    Depression Mother     Depression Sister     Cancer Maternal Aunt         lung     Social History     Socioeconomic History    Marital status:      Spouse name: Not on file    Number of children: 3    Years of education: Not on file    Highest education level: Not on file   Occupational History    Occupation: office work   Tobacco Use    Smoking status: Never    Smokeless tobacco: Never   Substance and Sexual Activity    Alcohol use: Yes     Comment: occ    Drug use: No    Sexual activity: Not on file   Other Topics Concern Not on file   Social History Narrative    Walks the dog leisurely pace, healthy diet, walks on treadmill     Social Determinants of Health     Financial Resource Strain: Low Risk     Difficulty of Paying Living Expenses: Not hard at all   Food Insecurity: No Food Insecurity    Worried About Running Out of Food in the Last Year: Never true    Ran Out of Food in the Last Year: Never true   Transportation Needs: Not on file   Physical Activity: Not on file   Stress: Not on file   Social Connections: Not on file   Intimate Partner Violence: Not on file   Housing Stability: Not on file     Current Facility-Administered Medications   Medication Dose Route Frequency Provider Last Rate Last Admin    ketorolac (TORADOL) injection 30 mg  30 mg IntraVENous Once RAINE Ledesma        ondansetron [facility]) injection 4 mg  4 mg IntraVENous Once Fabiana Ledesma         Current Outpatient Medications   Medication Sig Dispense Refill    venlafaxine (EFFEXOR XR) 150 MG extended release capsule Take 1 capsule by mouth daily 90 capsule 3    venlafaxine (EFFEXOR XR) 75 MG extended release capsule TAKE 1 CAPSULE BY MOUTH EVERY DAY 90 capsule 3    busPIRone (BUSPAR) 10 MG tablet Take 1-1.5 tablets by mouth 3 times daily as needed (anxiety) 30 tablet 5    ibuprofen (ADVIL;MOTRIN) 400 MG tablet Take 1 tablet by mouth every 6 hours as needed for Pain 20 tablet 0    levonorgestrel (MIRENA, 52 MG,) IUD 52 mg 1 each by Intrauterine route once       Allergies   Allergen Reactions    Penicillins Hives       REVIEW OF SYSTEMS  10 systems reviewed, pertinent positives per HPI otherwise noted to be negative    PHYSICAL EXAM  BP (!) 126/59   Pulse 80   Temp (!) 96.5 °F (35.8 °C) (Oral)   Resp 16   Ht 5' 4\" (1.626 m)   Wt 165 lb (74.8 kg)   SpO2 100%   BMI 28.32 kg/m²   GENERAL APPEARANCE: Awake and alert. Cooperative. No acute distress. HEAD: Normocephalic. Atraumatic. EYES: PERRL. EOM's grossly intact.    ENT: Mucous membranes are moist.  NECK: Supple. HEART: RRR. No murmurs. LUNGS: Respirations unlabored. CTAB. Good air exchange. Speaking comfortably in full sentences. ABDOMEN: Soft. Non-distended. Epigastric and right upper quadrant discomfort without rigidity, guarding or rebound. Mildly positive Navarrete's. Negative McBurney's and Rovsing's. No fluids or ascites. No hernias or masses. Bowel sounds normal in all quadrants. No CVA tenderness. EXTREMITIES: No peripheral edema. Moves all extremities equally. All extremities neurovascularly intact. SKIN: Warm and dry. No acute rashes. NEUROLOGICAL: Alert and oriented. CN's 2-12 intact. No gross facial drooping. Strength 5/5, sensation intact. PSYCHIATRIC: Normal mood and affect. RADIOLOGY  US GALLBLADDER RUQ    Result Date: 7/31/2022  EXAMINATION: RIGHT UPPER QUADRANT ULTRASOUND 7/31/2022 2:27 pm COMPARISON: None. HISTORY: ORDERING SYSTEM PROVIDED HISTORY: ruq pain TECHNOLOGIST PROVIDED HISTORY: Reason for exam:->ruq pain FINDINGS: LIVER:  The liver is normal in size and echotexture. There is no ductal dilatation or mass. BILIARY SYSTEM:  The gallbladder is within normal limits without evidence of cholelithiases. The gallbladder wall is within normal limits. The common bile duct measures 5 mm. RIGHT KIDNEY: The right kidney is unremarkable measuring 10.6 cm. There is no renal mass or hydronephrosis. PANCREAS:  Visualized portions of the pancreas are unremarkable. OTHER: No evidence of right upper quadrant ascites. 1. No acute abnormality. XR CHEST PORTABLE    Result Date: 7/31/2022  EXAMINATION: ONE XRAY VIEW OF THE CHEST 7/31/2022 3:11 pm COMPARISON: Chest x-ray dated 08/17/2020. HISTORY: ORDERING SYSTEM PROVIDED HISTORY: sob TECHNOLOGIST PROVIDED HISTORY: Reason for exam:->sob Reason for Exam: sob FINDINGS: HEART/MEDIASTINUM: The cardiomediastinal silhouette is within normal limits. PLEURA/LUNGS: There are no focal consolidations or pleural effusions.  There is no appreciable pneumothorax. BONES/SOFT TISSUE: No acute abnormality. No radiographic evidence of acute pulmonary disease. ED COURSE  Patient received Toradol and Zofran IV for pain, with good relief. Triage vitals stable. CBC without leukocytosis or anemia. CMP unremarkable. Lipase within normal limits. Urinalysis without evidence for infectious process. No ketonuria. hCG negative. Shortly after patient returned from ultrasound had what appeared to be a brief panic attack. Reports some tingling in the face and hands and feeling of lightheadedness. Does have history of anxiety. No hallucinations or delusions. She denies chest pain shortness of breath. EKG obtained which was normal sinus rhythm. Stable portable chest x-ray. Right upper quadrant ultrasound unremarkable. On subsequent evaluation patient reports that she is now feeling better although pain beginning to return. Do still have concern for gallbladder etiology. Patient will be discharged with GI and general surgery referral.  Abdomen remains nonsurgical at this time. Discussed return precautions and recommendations for follow-up otherwise and she is in agreement and comfortable at discharge. A discussion was had with Ms. iSu regarding postprandial pain, ED findings and recommendations for follow-up. Risk management discussed and shared decision making had with patient and/or surrogate. All questions were answered. Patient will follow up with PCP, GI, general surgery within 1 week for further evaluation/treatment. All questions answered. Patient will return to ED for new/worsening symptoms. Patient was sent home with a prescription for Ultram, Zofran and Pepcid and informed to continue home medications as prescribed.     MDM  Results for orders placed or performed during the hospital encounter of 07/31/22   CBC with Auto Differential   Result Value Ref Range    WBC 7.5 4.0 - 11.0 K/uL    RBC 4.66 4.00 - 5.20 M/uL Hemoglobin 13.6 12.0 - 16.0 g/dL    Hematocrit 40.0 36.0 - 48.0 %    MCV 85.9 80.0 - 100.0 fL    MCH 29.1 26.0 - 34.0 pg    MCHC 33.9 31.0 - 36.0 g/dL    RDW 14.0 12.4 - 15.4 %    Platelets 309 042 - 857 K/uL    MPV 9.4 5.0 - 10.5 fL    Neutrophils % 60.7 %    Lymphocytes % 20.0 %    Monocytes % 5.7 %    Eosinophils % 13.1 %    Basophils % 0.5 %    Neutrophils Absolute 4.6 1.7 - 7.7 K/uL    Lymphocytes Absolute 1.5 1.0 - 5.1 K/uL    Monocytes Absolute 0.4 0.0 - 1.3 K/uL    Eosinophils Absolute 1.0 (H) 0.0 - 0.6 K/uL    Basophils Absolute 0.0 0.0 - 0.2 K/uL   Comprehensive Metabolic Panel   Result Value Ref Range    Sodium 135 (L) 136 - 145 mmol/L    Potassium 3.8 3.5 - 5.1 mmol/L    Chloride 102 99 - 110 mmol/L    CO2 21 21 - 32 mmol/L    Anion Gap 12 3 - 16    Glucose 127 (H) 70 - 99 mg/dL    BUN 9 7 - 20 mg/dL    Creatinine 0.7 0.6 - 1.1 mg/dL    GFR Non-African American >60 >60    GFR African American >60 >60    Calcium 9.3 8.3 - 10.6 mg/dL    Total Protein 6.6 6.4 - 8.2 g/dL    Albumin 4.7 3.4 - 5.0 g/dL    Albumin/Globulin Ratio 2.5 (H) 1.1 - 2.2    Total Bilirubin 0.4 0.0 - 1.0 mg/dL    Alkaline Phosphatase 71 40 - 129 U/L    ALT 7 (L) 10 - 40 U/L    AST 10 (L) 15 - 37 U/L   Lipase   Result Value Ref Range    Lipase 57.0 13.0 - 60.0 U/L   Urinalysis   Result Value Ref Range    Color, UA DARK YELLOW (A) Straw/Yellow    Clarity, UA Clear Clear    Glucose, Ur Negative Negative mg/dL    Bilirubin Urine Negative Negative    Ketones, Urine Negative Negative mg/dL    Specific Gravity, UA 1.015 1.005 - 1.030    Blood, Urine Negative Negative    pH, UA 8.5 (A) 5.0 - 8.0    Protein, UA Negative Negative mg/dL    Urobilinogen, Urine 2.0 (A) <2.0 E.U./dL    Nitrite, Urine Negative Negative    Leukocyte Esterase, Urine Negative Negative    Microscopic Examination Not Indicated     Urine Type NotGiven    HCG, Quantitative, Pregnancy   Result Value Ref Range    hCG Quant <5.0 <5.0 mIU/mL   Troponin   Result Value Ref Range Troponin <0.01 <0.01 ng/mL   EKG 12 Lead   Result Value Ref Range    Ventricular Rate 75 BPM    Atrial Rate 75 BPM    P-R Interval 146 ms    QRS Duration 88 ms    Q-T Interval 500 ms    QTc Calculation (Bazett) 558 ms    P Axis 69 degrees    R Axis 66 degrees    T Axis 38 degrees    Diagnosis       Sinus rhythm with Premature supraventricular complexesNonspecific ST and T wave abnormalityProlonged QTAbnormal ECGWhen compared with ECG of 17-AUG-2020 01:44,Premature supraventricular complexes are now PresentQT has lengthened     I estimate there is LOW risk for ACUTE APPENDICITIS, BOWEL OBSTRUCTION, CHOLECYSTITIS, DIVERTICULITIS, INCARCERATED HERNIA, PANCREATITIS, or PERFORATED BOWEL or ULCER, thus I consider the discharge disposition reasonable. Also, there is no evidence or peritonitis, sepsis, or toxicity. Ute Maharaj and I have discussed the diagnosis and risks, and we agree with discharging home to follow-up with their primary doctor. We also discussed returning to the Emergency Department immediately if new or worsening symptoms occur. We have discussed the symptoms which are most concerning (e.g., bloody stool, fever, changing or worsening pain, vomiting) that necessitate immediate return. FINAL Impression  1. Abdominal pain, right upper quadrant      Blood pressure 137/65, pulse 75, temperature (!) 96.5 °F (35.8 °C), temperature source Oral, resp. rate 21, height 5' 4\" (1.626 m), weight 165 lb (74.8 kg), SpO2 100 %, not currently breastfeeding. DISPOSITION  Patient was discharged to home in good condition.          [place]  07/31/22 [id number]

## 2022-07-31 NOTE — ED PROVIDER NOTES
I have reviewed the below EKG. I was not otherwise involved in this patient's care. EKG  The Ekg interpreted by myself  normal sinus rhythm with a rate of 75  Axis is   Normal  QTc is   prolonged  Intervals and Durations are unremarkable. No specific ST-T wave changes appreciated. No evidence of acute ischemia. No prior EKG for comparison.         Arya Fontanez MD  07/31/22 [id number]

## 2022-08-03 LAB
EKG ATRIAL RATE: 75 BPM
EKG DIAGNOSIS: NORMAL
EKG P AXIS: 69 DEGREES
EKG P-R INTERVAL: 146 MS
EKG Q-T INTERVAL: 500 MS
EKG QRS DURATION: 88 MS
EKG QTC CALCULATION (BAZETT): 558 MS
EKG R AXIS: 66 DEGREES
EKG T AXIS: 38 DEGREES
EKG VENTRICULAR RATE: 75 BPM

## 2022-08-03 PROCEDURE — 93010 ELECTROCARDIOGRAM REPORT: CPT | Performed by: INTERNAL MEDICINE

## 2022-08-08 ENCOUNTER — APPOINTMENT (OUTPATIENT)
Dept: CT IMAGING | Age: 47
End: 2022-08-08
Payer: COMMERCIAL

## 2022-08-08 ENCOUNTER — HOSPITAL ENCOUNTER (EMERGENCY)
Age: 47
Discharge: HOME | End: 2022-08-08
Attending: EMERGENCY MEDICINE
Payer: COMMERCIAL

## 2022-08-08 VITALS
OXYGEN SATURATION: 97 % | SYSTOLIC BLOOD PRESSURE: 103 MMHG | BODY MASS INDEX: 28.17 KG/M2 | HEART RATE: 60 BPM | TEMPERATURE: 98.9 F | WEIGHT: 165 LBS | RESPIRATION RATE: 15 BRPM | HEIGHT: 64 IN | DIASTOLIC BLOOD PRESSURE: 62 MMHG

## 2022-08-08 DIAGNOSIS — R10.84 GENERALIZED ABDOMINAL PAIN: Primary | ICD-10-CM

## 2022-08-08 DIAGNOSIS — K52.9 COLITIS: ICD-10-CM

## 2022-08-08 LAB
A/G RATIO: 1.9 (ref 1.1–2.2)
ALBUMIN SERPL-MCNC: 5 G/DL (ref 3.4–5)
ALP BLD-CCNC: 79 U/L (ref 40–129)
ALT SERPL-CCNC: 11 U/L (ref 10–40)
ANION GAP SERPL CALCULATED.3IONS-SCNC: 14 MMOL/L (ref 3–16)
AST SERPL-CCNC: 16 U/L (ref 15–37)
BACTERIA: ABNORMAL /HPF
BASOPHILS ABSOLUTE: 0.1 K/UL (ref 0–0.2)
BASOPHILS RELATIVE PERCENT: 0.6 %
BILIRUB SERPL-MCNC: 0.4 MG/DL (ref 0–1)
BILIRUBIN URINE: ABNORMAL
BLOOD, URINE: ABNORMAL
BUN BLDV-MCNC: 9 MG/DL (ref 7–20)
CALCIUM SERPL-MCNC: 9.8 MG/DL (ref 8.3–10.6)
CHLORIDE BLD-SCNC: 101 MMOL/L (ref 99–110)
CLARITY: CLEAR
CO2: 24 MMOL/L (ref 21–32)
COLOR: ABNORMAL
CREAT SERPL-MCNC: 0.8 MG/DL (ref 0.6–1.1)
EKG ATRIAL RATE: 54 BPM
EKG DIAGNOSIS: NORMAL
EKG P AXIS: 56 DEGREES
EKG P-R INTERVAL: 158 MS
EKG Q-T INTERVAL: 478 MS
EKG QRS DURATION: 92 MS
EKG QTC CALCULATION (BAZETT): 453 MS
EKG R AXIS: 63 DEGREES
EKG T AXIS: 46 DEGREES
EKG VENTRICULAR RATE: 54 BPM
EOSINOPHILS ABSOLUTE: 1.4 K/UL (ref 0–0.6)
EOSINOPHILS RELATIVE PERCENT: 15.2 %
EPITHELIAL CELLS, UA: ABNORMAL /HPF (ref 0–5)
FINE CASTS, UA: ABNORMAL /LPF (ref 0–2)
GFR AFRICAN AMERICAN: >60
GFR NON-AFRICAN AMERICAN: >60
GLUCOSE BLD-MCNC: 98 MG/DL (ref 70–99)
GLUCOSE URINE: NEGATIVE MG/DL
HCT VFR BLD CALC: 43.7 % (ref 36–48)
HEMOGLOBIN: 14.8 G/DL (ref 12–16)
KETONES, URINE: ABNORMAL MG/DL
LEUKOCYTE ESTERASE, URINE: ABNORMAL
LIPASE: 52 U/L (ref 13–60)
LYMPHOCYTES ABSOLUTE: 1.9 K/UL (ref 1–5.1)
LYMPHOCYTES RELATIVE PERCENT: 20.8 %
MCH RBC QN AUTO: 29.2 PG (ref 26–34)
MCHC RBC AUTO-ENTMCNC: 34 G/DL (ref 31–36)
MCV RBC AUTO: 85.8 FL (ref 80–100)
MICROSCOPIC EXAMINATION: YES
MONOCYTES ABSOLUTE: 0.8 K/UL (ref 0–1.3)
MONOCYTES RELATIVE PERCENT: 8.7 %
MUCUS: ABNORMAL /LPF
NEUTROPHILS ABSOLUTE: 5 K/UL (ref 1.7–7.7)
NEUTROPHILS RELATIVE PERCENT: 54.7 %
NITRITE, URINE: NEGATIVE
PDW BLD-RTO: 14.1 % (ref 12.4–15.4)
PH UA: 8.5 (ref 5–8)
PLATELET # BLD: 183 K/UL (ref 135–450)
PMV BLD AUTO: 9.5 FL (ref 5–10.5)
POTASSIUM SERPL-SCNC: 3.8 MMOL/L (ref 3.5–5.1)
PROTEIN UA: ABNORMAL MG/DL
RBC # BLD: 5.09 M/UL (ref 4–5.2)
RBC UA: ABNORMAL /HPF (ref 0–4)
SODIUM BLD-SCNC: 139 MMOL/L (ref 136–145)
SPECIFIC GRAVITY UA: 1.01 (ref 1–1.03)
TOTAL PROTEIN: 7.7 G/DL (ref 6.4–8.2)
URINE TYPE: ABNORMAL
UROBILINOGEN, URINE: 1 E.U./DL
WBC # BLD: 9 K/UL (ref 4–11)
WBC UA: ABNORMAL /HPF (ref 0–5)

## 2022-08-08 PROCEDURE — 96374 THER/PROPH/DIAG INJ IV PUSH: CPT

## 2022-08-08 PROCEDURE — 74177 CT ABD & PELVIS W/CONTRAST: CPT

## 2022-08-08 PROCEDURE — 6370000000 HC RX 637 (ALT 250 FOR IP): Performed by: EMERGENCY MEDICINE

## 2022-08-08 PROCEDURE — 6360000002 HC RX W HCPCS: Performed by: EMERGENCY MEDICINE

## 2022-08-08 PROCEDURE — 93005 ELECTROCARDIOGRAM TRACING: CPT | Performed by: EMERGENCY MEDICINE

## 2022-08-08 PROCEDURE — 81001 URINALYSIS AUTO W/SCOPE: CPT

## 2022-08-08 PROCEDURE — 6360000004 HC RX CONTRAST MEDICATION: Performed by: EMERGENCY MEDICINE

## 2022-08-08 PROCEDURE — 93010 ELECTROCARDIOGRAM REPORT: CPT | Performed by: INTERNAL MEDICINE

## 2022-08-08 PROCEDURE — 99285 EMERGENCY DEPT VISIT HI MDM: CPT

## 2022-08-08 PROCEDURE — 85025 COMPLETE CBC W/AUTO DIFF WBC: CPT

## 2022-08-08 PROCEDURE — 83690 ASSAY OF LIPASE: CPT

## 2022-08-08 PROCEDURE — 80053 COMPREHEN METABOLIC PANEL: CPT

## 2022-08-08 RX ORDER — METOCLOPRAMIDE HYDROCHLORIDE 5 MG/ML
10 INJECTION INTRAMUSCULAR; INTRAVENOUS ONCE
Status: COMPLETED | OUTPATIENT
Start: 2022-08-08 | End: 2022-08-08

## 2022-08-08 RX ORDER — ONDANSETRON 2 MG/ML
4 INJECTION INTRAMUSCULAR; INTRAVENOUS ONCE
Status: COMPLETED | OUTPATIENT
Start: 2022-08-08 | End: 2022-08-08

## 2022-08-08 RX ORDER — HYDROCODONE BITARTRATE AND ACETAMINOPHEN 5; 325 MG/1; MG/1
1 TABLET ORAL ONCE
Status: COMPLETED | OUTPATIENT
Start: 2022-08-08 | End: 2022-08-08

## 2022-08-08 RX ORDER — DIPHENHYDRAMINE HCL 25 MG
25 TABLET ORAL ONCE
Status: COMPLETED | OUTPATIENT
Start: 2022-08-08 | End: 2022-08-08

## 2022-08-08 RX ORDER — DICYCLOMINE HYDROCHLORIDE 10 MG/1
10 CAPSULE ORAL EVERY 6 HOURS PRN
Qty: 20 CAPSULE | Refills: 0 | Status: SHIPPED | OUTPATIENT
Start: 2022-08-08

## 2022-08-08 RX ORDER — METOCLOPRAMIDE 10 MG/1
10 TABLET ORAL 4 TIMES DAILY
Qty: 20 TABLET | Refills: 0 | Status: SHIPPED | OUTPATIENT
Start: 2022-08-08

## 2022-08-08 RX ORDER — CIPROFLOXACIN 500 MG/1
500 TABLET, FILM COATED ORAL 2 TIMES DAILY
Qty: 14 TABLET | Refills: 0 | Status: SHIPPED | OUTPATIENT
Start: 2022-08-08 | End: 2022-08-15

## 2022-08-08 RX ORDER — METRONIDAZOLE 500 MG/1
500 TABLET ORAL 2 TIMES DAILY
Qty: 14 TABLET | Refills: 0 | Status: SHIPPED | OUTPATIENT
Start: 2022-08-08 | End: 2022-08-15

## 2022-08-08 RX ORDER — FENTANYL CITRATE 50 UG/ML
25 INJECTION, SOLUTION INTRAMUSCULAR; INTRAVENOUS ONCE
Status: COMPLETED | OUTPATIENT
Start: 2022-08-08 | End: 2022-08-08

## 2022-08-08 RX ORDER — ONDANSETRON 4 MG/1
4 TABLET, ORALLY DISINTEGRATING ORAL EVERY 8 HOURS PRN
Qty: 20 TABLET | Refills: 0 | Status: SHIPPED | OUTPATIENT
Start: 2022-08-08

## 2022-08-08 RX ORDER — DICYCLOMINE HYDROCHLORIDE 10 MG/1
10 CAPSULE ORAL ONCE
Status: COMPLETED | OUTPATIENT
Start: 2022-08-08 | End: 2022-08-08

## 2022-08-08 RX ADMIN — ONDANSETRON 4 MG: 2 INJECTION INTRAMUSCULAR; INTRAVENOUS at 01:13

## 2022-08-08 RX ADMIN — DIPHENHYDRAMINE HCL 25 MG: 25 TABLET ORAL at 02:35

## 2022-08-08 RX ADMIN — FENTANYL CITRATE 25 MCG: 0.05 INJECTION, SOLUTION INTRAMUSCULAR; INTRAVENOUS at 01:13

## 2022-08-08 RX ADMIN — HYDROCODONE BITARTRATE AND ACETAMINOPHEN 1 TABLET: 5; 325 TABLET ORAL at 03:03

## 2022-08-08 RX ADMIN — DICYCLOMINE HYDROCHLORIDE 10 MG: 10 CAPSULE ORAL at 02:35

## 2022-08-08 RX ADMIN — METOCLOPRAMIDE HYDROCHLORIDE 10 MG: 5 INJECTION INTRAMUSCULAR; INTRAVENOUS at 02:34

## 2022-08-08 RX ADMIN — IOPAMIDOL 75 ML: 755 INJECTION, SOLUTION INTRAVENOUS at 02:08

## 2022-08-08 ASSESSMENT — PAIN SCALES - GENERAL
PAINLEVEL_OUTOF10: 3
PAINLEVEL_OUTOF10: 8
PAINLEVEL_OUTOF10: 5
PAINLEVEL_OUTOF10: 8
PAINLEVEL_OUTOF10: 5

## 2022-08-08 ASSESSMENT — PAIN DESCRIPTION - LOCATION: LOCATION: ABDOMEN

## 2022-08-08 ASSESSMENT — PAIN - FUNCTIONAL ASSESSMENT: PAIN_FUNCTIONAL_ASSESSMENT: 0-10

## 2022-08-08 NOTE — Clinical Note
Samara Alisia was seen and treated in our emergency department on 8/8/2022. She may return to work on 08/10/2022. If you have any questions or concerns, please don't hesitate to call.       Jigna Ware MD

## 2022-08-08 NOTE — ED NOTES
Anais Rivera for d/c. Stable, ambulatory, did not want a wheelchair. With .       Maude Fuentes RN  08/08/22 [id number]

## 2022-08-10 ASSESSMENT — ENCOUNTER SYMPTOMS
TROUBLE SWALLOWING: 0
SHORTNESS OF BREATH: 0
ABDOMINAL PAIN: 1
VOMITING: 0
COLOR CHANGE: 0
COUGH: 0
PHOTOPHOBIA: 0

## 2022-08-10 NOTE — ED PROVIDER NOTES
[facility]  ED  EMERGENCY DEPARTMENTENCOUNTER      Pt Name: Cooper Christina  MRN: [id number]  Lucianogfnathaly 1975  Date ofevaluation: 8/8/2022  Provider: Steven Stevenson MD    CHIEF COMPLAINT       Chief Complaint   Patient presents with    Abdominal Pain     Seen here a week ago and sent to GI; GI follow up completed; came to ED tonight because of pain; pain 8/10         HISTORY OF PRESENT ILLNESS   (Location/Symptom, Timing/Onset,Context/Setting, Quality, Duration, Modifying Factors, Severity)  Note limiting factors. Cooper Christina is a 49 y.o. female  who  has a past medical history of Anxiety, Depression, and Strep sore throat. who presents to the emergency department for evaluation of epigastric abdominal pain. Patient reports that she was seen approximately week previously the emergency department and diagnosed with biliary colic. Reports that she has followed up with GI specialist and is scheduled for HIDA scan. She reports worsening right upper quadrant abdominal pain epigastric Coy pain that occurred after eating tonight. Denies fevers. Reports associated nausea denies vomiting denies changes and bowel or urine function. She does not take medications for symptoms. Denies a history of previous abdominal surgeries. HPI    NursingNotes were reviewed. REVIEW OF SYSTEMS    (2-9 systems for level 4, 10 or more for level 5)     Review of Systems   Constitutional:  Negative for activity change and fatigue. HENT:  Negative for congestion, mouth sores and trouble swallowing. Eyes:  Negative for photophobia and visual disturbance. Respiratory:  Negative for cough and shortness of breath. Cardiovascular:  Negative for chest pain and palpitations. Gastrointestinal:  Positive for abdominal pain. Negative for vomiting. Genitourinary:  Negative for difficulty urinating and frequency. Musculoskeletal:  Negative for gait problem and neck pain.    Skin:  Negative for color change and rash. Neurological:  Negative for dizziness, light-headedness and headaches. Psychiatric/Behavioral:  Negative for confusion. The patient is not nervous/anxious. Except as noted above the remainder of the review of systems was reviewed and negative. PAST MEDICAL HISTORY     Past Medical History:   Diagnosis Date    Anxiety     Depression     Strep sore throat          SURGICALHISTORY       Past Surgical History:   Procedure Laterality Date    BLADDER SUSPENSION      COLONOSCOPY  6-1-15    no polyps. normal.    COSMETIC SURGERY           CURRENT MEDICATIONS       Discharge Medication List as of 8/8/2022  3:05 AM        CONTINUE these medications which have NOT CHANGED    Details   !! ondansetron (ZOFRAN ODT) 4 MG disintegrating tablet Take 1 tablet by mouth every 8 hours as needed for Nausea or Vomiting, Disp-20 tablet, R-0Normal      famotidine (PEPCID) 20 MG tablet Take 1 tablet by mouth in the morning and 1 tablet before bedtime. , Disp-60 tablet, R-3Normal      !! venlafaxine (EFFEXOR XR) 150 MG extended release capsule Take 1 capsule by mouth daily, Disp-90 capsule, R-3Normal      !! venlafaxine (EFFEXOR XR) 75 MG extended release capsule TAKE 1 CAPSULE BY MOUTH EVERY DAY, Disp-90 capsule, R-3Normal      busPIRone (BUSPAR) 10 MG tablet Take 1-1.5 tablets by mouth 3 times daily as needed (anxiety), Disp-30 tablet, R-5Normal      ibuprofen (ADVIL;MOTRIN) 400 MG tablet Take 1 tablet by mouth every 6 hours as needed for Pain, Disp-20 tablet,R-0Print      levonorgestrel (MIRENA, 52 MG,) IUD 52 mg 1 each by Intrauterine route once, Intrauterine, ONCE, Historical Med       !! - Potential duplicate medications found. Please discuss with provider.                Penicillins    FAMILY HISTORY       Family History   Problem Relation Age of Onset    Depression Mother     Depression Sister     Cancer Maternal Aunt         lung          SOCIAL HISTORY       Social History     Socioeconomic History Marital status:      Spouse name: None    Number of children: 3    Years of education: None    Highest education level: None   Occupational History    Occupation: office work   Tobacco Use    Smoking status: Never    Smokeless tobacco: Never   Substance and Sexual Activity    Alcohol use: Yes     Comment: occ    Drug use: No   Social History Narrative    Walks the dog leisurely pace, healthy diet, walks on treadmill     Social Determinants of Health     Financial Resource Strain: Low Risk     Difficulty of Paying Living Expenses: Not hard at all   Food Insecurity: No Food Insecurity    Worried About [facility] in the Last Year: Never true    [facility] in the Last Year: Never true       SCREENINGS    [place] Coma Scale  Eye Opening: Spontaneous  Best Verbal Response: Oriented  Best Motor Response: Obeys commands  [place] Coma Scale Score: 15        PHYSICAL EXAM    (up to 7 for level 4, 8 or more for level 5)     ED Triage Vitals [08/08/22 0044]   BP Temp Temp Source Heart Rate Resp SpO2 Height Weight   123/86 98.9 °F (37.2 °C) Oral 65 19 100 % 5' 4\" (1.626 m) 165 lb (74.8 kg)       Physical Exam  Vitals reviewed. Constitutional:       Appearance: She is well-developed. HENT:      Head: Normocephalic and atraumatic. Mouth/Throat:      Mouth: Mucous membranes are moist.   Eyes:      Extraocular Movements: Extraocular movements intact. Conjunctiva/sclera: Conjunctivae normal.      Pupils: Pupils are equal, round, and reactive to light. Neck:      Trachea: No tracheal deviation. Cardiovascular:      Rate and Rhythm: Normal rate and regular rhythm. Heart sounds: Normal heart sounds. Pulmonary:      Effort: Pulmonary effort is normal.      Breath sounds: Normal breath sounds. Abdominal:      General: There is no distension. Palpations: Abdomen is soft. Tenderness: There is abdominal tenderness in the epigastric area.    Musculoskeletal:         General: Normal range of motion. Cervical back: Normal range of motion. Skin:     General: Skin is warm and dry. Neurological:      Mental Status: She is alert. RESULTS     EKG: All EKG's are interpreted by the Emergency Department Physician who either signs or Co-signsthis chart in the absence of a cardiologist.    EKG demonstrates a sinus rhythm ventricular to 54 bpm.  MS interval and QTc interval within normal limits. Patient has normal axis. There are no significant ST elevations or depressions EKG is nondiagnostic for ACS. Bernadette Norman Compared EKG from 7/31/2022 he can appreciate significant change. RADIOLOGY:   Non-plain filmimages such as CT, Ultrasound and MRI are read by the radiologist. Plain radiographic images are visualized and preliminarily interpreted by the emergency physician with the below findings:        Interpretation per the Radiologist below, if available at the time ofthis note:    CT ABDOMEN PELVIS W IV CONTRAST Additional Contrast? None   Final Result   Ileus versus enterocolitis.                ED BEDSIDE ULTRASOUND:   Performed by ED Physician - none    LABS:  Labs Reviewed   CBC WITH AUTO DIFFERENTIAL - Abnormal; Notable for the following components:       Result Value    Eosinophils Absolute 1.4 (*)     All other components within normal limits   URINALYSIS - Abnormal; Notable for the following components:    Color, UA DARK YELLOW (*)     Bilirubin Urine SMALL (*)     Ketones, Urine TRACE (*)     Blood, Urine TRACE-INTACT (*)     pH, UA 8.5 (*)     Protein, UA TRACE (*)     Leukocyte Esterase, Urine TRACE (*)     All other components within normal limits   MICROSCOPIC URINALYSIS - Abnormal; Notable for the following components:    Fine Casts, UA 3-5 (*)     Mucus, UA 3+ (*)     WBC, UA 10-20 (*)     Epithelial Cells, UA 21-50 (*)     Bacteria, UA 3+ (*)     All other components within normal limits   COMPREHENSIVE METABOLIC PANEL   LIPASE       All other labs were within normal range or not returned as of this dictation. EMERGENCY DEPARTMENT COURSE and DIFFERENTIAL DIAGNOSIS/MDM:   Vitals:    Vitals:    08/08/22 0216 08/08/22 0230 08/08/22 0245 08/08/22 0300   BP: 131/89 (!) 107/93 (!) 106/59 103/62   Pulse: 59 54 57 60   Resp: 16 14 11 15   Temp:       TempSrc:       SpO2: 100% 96% 94% 97%   Weight:       Height:           Patient was given thefollowing medications:  Medications   ondansetron (ZOFRAN) injection 4 mg (4 mg IntraVENous Given 8/8/22 0113)   fentaNYL (SUBLIMAZE) injection 25 mcg (25 mcg IntraVENous Given 8/8/22 0113)   iopamidol (ISOVUE-370) 76 % injection 75 mL (75 mLs IntraVENous Given 8/8/22 0208)   dicyclomine (BENTYL) capsule 10 mg (10 mg Oral Given 8/8/22 0235)   metoclopramide (REGLAN) injection 10 mg (10 mg IntraVENous Given 8/8/22 0234)   diphenhydrAMINE (BENADRYL) tablet 25 mg (25 mg Oral Given 8/8/22 0235)   HYDROcodone-acetaminophen (NORCO) 5-325 MG per tablet 1 tablet (1 tablet Oral Given 8/8/22 0303)       ED COURSE & MEDICAL DECISION MAKING    Pertinent Labs & Imaging studies reviewed. (See chart for details)   -  Patient seen and evaluated in the emergency department. -  Triage and nursing notes reviewed and incorporated. -  Old chart records reviewed and incorporated. -  Differential diagnosis includes: Differential diagnosis: Abdominal Aortic Aneurysm, Acute Coronary Syndrome, Ischemic Bowel, Bowel Obstruction (including Gastric Outlet Obstruction), PUD, GERD, Acute Cholecystitis, Pancreatitis, Hepatitis, Colitis, SMA Syndrome, Mesenteric Steal Syndrome, Splanchnic Vein Thrombosis, other    -  Work-up included:  See above  -  ED treatment included: See above  -  Results discussed with patient. Patient resents ED for evaluation epigastric abdominal pain. On presentation patient does have epigastric tenderness without rebound or guarding. Negative Navarrete sign signs within normal limits.  labs show no emergent laboratory normalities, urinalysis potentially infected but is contaminated. . Imaging studies show ileus versus colitis. Patient does report having normal bowel function. He reports improvement on reevaluation. She is tolerating p.o. Was discussed with patient and will discharge home with outpatient follow-up. She was started on Cipro and Flagyl to cover for colitis as well as UTI. Jermaine Monae Patient feels improved on reevaluation. Symptomatic treatment with expectant management discussed with the patient and they and/or family members present are amenable to treatment plan and outpatient follow-up. Strict return precautions were discussed with the patient and those present. They demonstrated understanding of when to return to the emergency department for new or worsening symptoms. .  The patient is agreeable with plan of care and disposition. REASSESSMENT          CRITICAL CARE TIME   Total Critical Care time was 0 minutes, excluding separately reportable procedures. There was a high probability of clinically significant/life threatening deterioration in the patient's condition which required my urgent intervention. CONSULTS:  None    PROCEDURES:  Unless otherwise noted below, none     Procedures    FINAL IMPRESSION      1. Generalized abdominal pain    2. Colitis          DISPOSITION/PLAN   DISPOSITION Decision To Discharge 08/08/2022 03:05:04 AM      PATIENT REFERREDTO:  Jena Rosa DO  [address] [place] [address]            DISCHARGEMEDICATIONS:  Discharge Medication List as of 8/8/2022  3:05 AM        START taking these medications    Details   dicyclomine (BENTYL) 10 MG capsule Take 1 capsule by mouth every 6 hours as needed (cramps), Disp-20 capsule, R-0Normal      !! ondansetron (ZOFRAN ODT) 4 MG disintegrating tablet Take 1 tablet by mouth every 8 hours as needed for Nausea, Disp-20 tablet, R-0Normal      ciprofloxacin (CIPRO) 500 MG tablet Take 1 tablet by mouth in the morning and 1 tablet before bedtime.  Do all this for 7 days. , Disp-14 tablet, R-0Normal      metroNIDAZOLE (FLAGYL) 500 MG tablet Take 1 tablet by mouth in the morning and 1 tablet before bedtime. Do all this for 7 days. , Disp-14 tablet, R-0Normal      metoclopramide (REGLAN) 10 MG tablet Take 1 tablet by mouth in the morning and 1 tablet at noon and 1 tablet in the evening and 1 tablet before bedtime. WARNING:  May cause drowsiness. May impair ability to operate vehicles or machinery. Do not use in combination with alcohol. ., Disp-20 t ablet, R-0Normal       !! - Potential duplicate medications found. Please discuss with provider.              (Please note that portions of this note were completed with a voice recognition program.  Efforts were made to edit the dictations but occasionally words are mis-transcribed.)    Sindi Mccloud MD (electronically signed)  Attending Emergency Physician         Sindi Mccloud MD  08/10/22 4709

## 2022-08-24 DIAGNOSIS — F41.1 GAD (GENERALIZED ANXIETY DISORDER): ICD-10-CM

## 2022-08-24 DIAGNOSIS — F33.2 SEVERE EPISODE OF RECURRENT MAJOR DEPRESSIVE DISORDER, WITHOUT PSYCHOTIC FEATURES (HCC): ICD-10-CM

## 2022-08-24 NOTE — TELEPHONE ENCOUNTER
Refill Request     CONFIRM preferrred pharmacy with the patient. If Mail Order Rx - Pend for 90 day refill.       Last Seen: Last Seen Department: 11/29/2021  Last Seen by PCP: 11/29/2021    Last Written: 90 with 3 11/29/2021    Next Appointment:   Future Appointments   Date Time Provider Damaris Addison   8/25/2022  2:00 PM DO BRADLEY Bailey  [place] [facility]       Future appointment scheduled      Requested Prescriptions     Pending Prescriptions Disp Refills    venlafaxine (EFFEXOR XR) 150 MG extended release capsule 90 capsule 3     Sig: Take 1 capsule by mouth daily

## 2022-08-25 ENCOUNTER — OFFICE VISIT (OUTPATIENT)
Dept: FAMILY MEDICINE CLINIC | Age: 47
End: 2022-08-25
Payer: COMMERCIAL

## 2022-08-25 VITALS
OXYGEN SATURATION: 99 % | BODY MASS INDEX: 26.8 KG/M2 | DIASTOLIC BLOOD PRESSURE: 72 MMHG | TEMPERATURE: 97.9 F | SYSTOLIC BLOOD PRESSURE: 104 MMHG | HEART RATE: 60 BPM | WEIGHT: 157 LBS | HEIGHT: 64 IN

## 2022-08-25 DIAGNOSIS — K81.9 CHOLECYSTITIS: ICD-10-CM

## 2022-08-25 DIAGNOSIS — Z01.818 PRE-OP EXAMINATION: Primary | ICD-10-CM

## 2022-08-25 PROCEDURE — 99214 OFFICE O/P EST MOD 30 MIN: CPT | Performed by: STUDENT IN AN ORGANIZED HEALTH CARE EDUCATION/TRAINING PROGRAM

## 2022-08-25 RX ORDER — VENLAFAXINE HYDROCHLORIDE 150 MG/1
CAPSULE, EXTENDED RELEASE ORAL
Qty: 90 CAPSULE | Refills: 0 | Status: SHIPPED | OUTPATIENT
Start: 2022-08-25

## 2022-08-25 ASSESSMENT — ANXIETY QUESTIONNAIRES
7. FEELING AFRAID AS IF SOMETHING AWFUL MIGHT HAPPEN: 3
1. FEELING NERVOUS, ANXIOUS, OR ON EDGE: 3
5. BEING SO RESTLESS THAT IT IS HARD TO SIT STILL: 2
3. WORRYING TOO MUCH ABOUT DIFFERENT THINGS: 2
IF YOU CHECKED OFF ANY PROBLEMS ON THIS QUESTIONNAIRE, HOW DIFFICULT HAVE THESE PROBLEMS MADE IT FOR YOU TO DO YOUR WORK, TAKE CARE OF THINGS AT HOME, OR GET ALONG WITH OTHER PEOPLE: VERY DIFFICULT
6. BECOMING EASILY ANNOYED OR IRRITABLE: 2
4. TROUBLE RELAXING: 3
2. NOT BEING ABLE TO STOP OR CONTROL WORRYING: 2
GAD7 TOTAL SCORE: 17

## 2022-08-25 ASSESSMENT — PATIENT HEALTH QUESTIONNAIRE - PHQ9
5. POOR APPETITE OR OVEREATING: 2
8. MOVING OR SPEAKING SO SLOWLY THAT OTHER PEOPLE COULD HAVE NOTICED. OR THE OPPOSITE, BEING SO FIGETY OR RESTLESS THAT YOU HAVE BEEN MOVING AROUND A LOT MORE THAN USUAL: 3
3. TROUBLE FALLING OR STAYING ASLEEP: 2
SUM OF ALL RESPONSES TO PHQ QUESTIONS 1-9: 21
6. FEELING BAD ABOUT YOURSELF - OR THAT YOU ARE A FAILURE OR HAVE LET YOURSELF OR YOUR FAMILY DOWN: 2
10. IF YOU CHECKED OFF ANY PROBLEMS, HOW DIFFICULT HAVE THESE PROBLEMS MADE IT FOR YOU TO DO YOUR WORK, TAKE CARE OF THINGS AT HOME, OR GET ALONG WITH OTHER PEOPLE: 2
9. THOUGHTS THAT YOU WOULD BE BETTER OFF DEAD, OR OF HURTING YOURSELF: 1
4. FEELING TIRED OR HAVING LITTLE ENERGY: 3
7. TROUBLE CONCENTRATING ON THINGS, SUCH AS READING THE NEWSPAPER OR WATCHING TELEVISION: 3
1. LITTLE INTEREST OR PLEASURE IN DOING THINGS: 3
SUM OF ALL RESPONSES TO PHQ QUESTIONS 1-9: 20
SUM OF ALL RESPONSES TO PHQ9 QUESTIONS 1 & 2: 5
2. FEELING DOWN, DEPRESSED OR HOPELESS: 2
SUM OF ALL RESPONSES TO PHQ QUESTIONS 1-9: 21
SUM OF ALL RESPONSES TO PHQ QUESTIONS 1-9: 21

## 2022-08-25 ASSESSMENT — COLUMBIA-SUICIDE SEVERITY RATING SCALE - C-SSRS
6. HAVE YOU EVER DONE ANYTHING, STARTED TO DO ANYTHING, OR PREPARED TO DO ANYTHING TO END YOUR LIFE?: NO
2. HAVE YOU ACTUALLY HAD ANY THOUGHTS OF KILLING YOURSELF?: NO
1. WITHIN THE PAST MONTH, HAVE YOU WISHED YOU WERE DEAD OR WISHED YOU COULD GO TO SLEEP AND NOT WAKE UP?: YES

## 2022-08-25 NOTE — PROGRESS NOTES
Preoperative Evaluation    Subjective:   Kristian Castaneda is a 49 y.o. y.o.  female who presents to the office today for a preoperative consultation. Chief Complaint   Patient presents with    Pre-op Exam     Gallbladder, 08/29/2022,génesis Bahena, [address] cholecystectomy     Planned anesthesia is General.   The patient has the following known anesthesia issues: none   Patient has a bleeding risk of : no recent abnormal bleeding   Patient does not have objection to receiving blood products if needed. Review of Systems   Pertinent items are noted in HPI. Denies fevers, chills, diaphoresis, CP, SOB, dysphagia, urinary complaints, new edema, rashes, cyanosis    Past Medical History:   Diagnosis Date    Anxiety     Depression     Strep sore throat        Past Surgical History:   Procedure Laterality Date    BLADDER SUSPENSION      COLONOSCOPY  6-1-15    no polyps. normal.    COSMETIC SURGERY         Social History       Tobacco History       Smoking Status  Never      Smokeless Tobacco Use  Never              Alcohol History       Alcohol Use Status  Yes Comment  occ              Drug Use       Drug Use Status  No              Sexual Activity       Sexually Active  Not Asked                    Family History   Problem Relation Age of Onset    Depression Mother     Depression Sister     Cancer Maternal Aunt         lung       Current Outpatient Medications   Medication Sig Dispense Refill    dicyclomine (BENTYL) 10 MG capsule Take 1 capsule by mouth every 6 hours as needed (cramps) 20 capsule 0    ondansetron (ZOFRAN ODT) 4 MG disintegrating tablet Take 1 tablet by mouth every 8 hours as needed for Nausea 20 tablet 0    metoclopramide (REGLAN) 10 MG tablet Take 1 tablet by mouth in the morning and 1 tablet at noon and 1 tablet in the evening and 1 tablet before bedtime. WARNING:  May cause drowsiness. May impair ability to operate vehicles or machinery.   Do not use in combination with alcohol. . 20 tablet 0    ondansetron (ZOFRAN ODT) 4 MG disintegrating tablet Take 1 tablet by mouth every 8 hours as needed for Nausea or Vomiting 20 tablet 0    famotidine (PEPCID) 20 MG tablet Take 1 tablet by mouth in the morning and 1 tablet before bedtime. 60 tablet 3    venlafaxine (EFFEXOR XR) 150 MG extended release capsule Take 1 capsule by mouth daily 90 capsule 3    venlafaxine (EFFEXOR XR) 75 MG extended release capsule TAKE 1 CAPSULE BY MOUTH EVERY DAY 90 capsule 3    ibuprofen (ADVIL;MOTRIN) 400 MG tablet Take 1 tablet by mouth every 6 hours as needed for Pain 20 tablet 0    levonorgestrel (MIRENA) IUD 52 mg 1 each by Intrauterine route once      busPIRone (BUSPAR) 10 MG tablet Take 1-1.5 tablets by mouth 3 times daily as needed (anxiety) (Patient not taking: Reported on 8/25/2022) 30 tablet 5     No current facility-administered medications for this visit. Objective:   Vitals:    08/25/22 1403   BP: 104/72   Pulse: 60   Temp: 97.9 °F (36.6 °C)   SpO2: 99%       Physical Exam   /72 (Site: Right Upper Arm, Position: Sitting, Cuff Size: Medium Adult)   Pulse 60   Temp 97.9 °F (36.6 °C) (Oral)   Ht 5' 4\" (1.626 m)   Wt 157 lb (71.2 kg)   SpO2 99%   BMI 26.95 kg/m²   General appearance: alert, appears stated age, and cooperative  Throat: lips, mucosa, and tongue normal; teeth and gums normal  Lungs: clear to auscultation bilaterally  Heart: regular rate and rhythm, S1, S2 normal, no murmur, click, rub or gallop  Abdomen: soft, non-tender; bowel sounds normal; no masses,  no organomegaly  Extremities: extremities normal, atraumatic, no cyanosis or edema  Pulses: 2+ and symmetric  Skin: Skin color, texture, turgor normal. No rashes or lesions  Neurologic: Grossly normal     Predictors of intubation difficulty:   Morbid obesity?  no   Anatomically abnormal facies? no   Short, thick neck? no   Neck range of motion: normal   Dentition: No chipped, loose, or missing teeth.        Lab Review   Admission on 08/08/2022, Discharged on 08/08/2022   Component Date Value    WBC 08/08/2022 9.0     RBC 08/08/2022 5.09     Hemoglobin 08/08/2022 14.8     Hematocrit 08/08/2022 43.7     MCV 08/08/2022 85.8     MCH 08/08/2022 29.2     MCHC 08/08/2022 34.0     RDW 08/08/2022 14.1     Platelets 97/63/0662 183     MPV 08/08/2022 9.5     Neutrophils % 08/08/2022 54.7     Lymphocytes % 08/08/2022 20.8     Monocytes % 08/08/2022 8.7     Eosinophils % 08/08/2022 15.2     Basophils % 08/08/2022 0.6     Neutrophils Absolute 08/08/2022 5.0     Lymphocytes Absolute 08/08/2022 1.9     Monocytes Absolute 08/08/2022 0.8     Eosinophils Absolute 08/08/2022 1.4 (A)    Basophils Absolute 08/08/2022 0.1     Sodium 08/08/2022 139     Potassium 08/08/2022 3.8     Chloride 08/08/2022 101     CO2 08/08/2022 24     Anion Gap 08/08/2022 14     Glucose 08/08/2022 98     BUN 08/08/2022 9     Creatinine 08/08/2022 0.8     GFR Non- 08/08/2022 >60     GFR  08/08/2022 >60     Calcium 08/08/2022 9.8     Total Protein 08/08/2022 7.7     Albumin 08/08/2022 5.0     Albumin/Globulin Ratio 08/08/2022 1.9     Total Bilirubin 08/08/2022 0.4     Alkaline Phosphatase 08/08/2022 79     ALT 08/08/2022 11     AST 08/08/2022 16     Lipase 08/08/2022 52.0     Color, UA 08/08/2022 DARK YELLOW (A)    Clarity, UA 08/08/2022 Clear     Glucose, Ur 08/08/2022 Negative     Bilirubin Urine 08/08/2022 SMALL (A)    Ketones, Urine 08/08/2022 TRACE (A)    Specific [place], UA 08/08/2022 1.015     Blood, Urine 08/08/2022 TRACE-INTACT (A)    pH, UA 08/08/2022 8.5 (A)    Protein, UA 08/08/2022 TRACE (A)    Urobilinogen, Urine 08/08/2022 1.0     Nitrite, Urine 08/08/2022 Negative     Leukocyte Esterase, Urine 08/08/2022 TRACE (A)    Microscopic Examination 08/08/2022 YES     Urine Type 08/08/2022 NotGiven     Ventricular Rate 08/08/2022 54     Atrial Rate 08/08/2022 54     P-R Interval 08/08/2022 158     QRS Duration 08/08/2022 92     Q-T Interval 08/08/2022 478     QTc Calculation (Bazett) 08/08/2022 453     P Axis 08/08/2022 56     R [place] 08/08/2022 63     T [place] 08/08/2022 46     Diagnosis 08/08/2022 Sinus bradycardiaNormal ECGConfirmed by Wiley Sawant MD, Sin Mee ([id number]) on 8/8/2022 7:28:57 PM     Fine Casts, UA 08/08/2022 3-5 (A)    Mucus, UA 08/08/2022 3+ (A)    WBC, UA 08/08/2022 10-20 (A)    RBC, UA 08/08/2022 3-4     Epithelial Cells, UA 08/08/2022 21-50 (A)    Bacteria, UA 08/08/2022 3+ (A)   Admission on 07/31/2022, Discharged on 07/31/2022   Component Date Value    WBC 07/31/2022 7.5     RBC 07/31/2022 4.66     Hemoglobin 07/31/2022 13.6     Hematocrit 07/31/2022 40.0     MCV 07/31/2022 85.9     MCH 07/31/2022 29.1     MCHC 07/31/2022 33.9     RDW 07/31/2022 14.0     Platelets 61/69/4250 174     MPV 07/31/2022 9.4     Neutrophils % 07/31/2022 60.7     Lymphocytes % 07/31/2022 20.0     Monocytes % 07/31/2022 5.7     Eosinophils % 07/31/2022 13.1     Basophils % 07/31/2022 0.5     Neutrophils Absolute 07/31/2022 4.6     Lymphocytes Absolute 07/31/2022 1.5     Monocytes Absolute 07/31/2022 0.4     Eosinophils Absolute 07/31/2022 1.0 (A)    Basophils Absolute 07/31/2022 0.0     Sodium 07/31/2022 135 (A)    Potassium 07/31/2022 3.8     Chloride 07/31/2022 102     CO2 07/31/2022 21     Anion Gap 07/31/2022 12     Glucose 07/31/2022 127 (A)    BUN 07/31/2022 9     Creatinine 07/31/2022 0.7     GFR Non- 07/31/2022 >60     GFR  07/31/2022 >60     Calcium 07/31/2022 9.3     Total Protein 07/31/2022 6.6     Albumin 07/31/2022 4.7     Albumin/Globulin Ratio 07/31/2022 2.5 (A)    Total Bilirubin 07/31/2022 0.4     Alkaline Phosphatase 07/31/2022 71     ALT 07/31/2022 7 (A)    AST 07/31/2022 10 (A)    Lipase 07/31/2022 57.0     Color, UA 07/31/2022 DARK YELLOW (A)    Clarity, UA 07/31/2022 Clear     Glucose, Ur 07/31/2022 Negative     Bilirubin Urine 07/31/2022 Negative     Ketones, Urine 07/31/2022 Negative     Specific Gravity, UA 07/31/2022 1.015     Blood, Urine 07/31/2022 Negative     pH, UA 07/31/2022 8.5 (A)    Protein, UA 07/31/2022 Negative     Urobilinogen, Urine 07/31/2022 2.0 (A)    Nitrite, Urine 07/31/2022 Negative     Leukocyte Esterase, Urine 07/31/2022 Negative     Microscopic Examination 07/31/2022 Not Indicated     Urine Type 07/31/2022 NotGiven     hCG Quant 07/31/2022 <5.0     Ventricular Rate 07/31/2022 75     Atrial Rate 07/31/2022 75     P-R Interval 07/31/2022 146     QRS Duration 07/31/2022 88     Q-T Interval 07/31/2022 500     QTc Calculation (Bazett) 07/31/2022 558     P Axis 07/31/2022 69     R Axis 07/31/2022 66     T [place] 07/31/2022 38     Diagnosis 07/31/2022                      Value:Sinus rhythm with Premature supraventricular complexesNonspecific ST and T wave abnormalityProlonged QTAbnormal ECGWhen compared with ECG of 17-AUG-2020 01:44,Premature supraventricular complexes are now PresentQT has lengthenedConfirmed by Jone Angelucci ([id number]) on 8/3/2022 2:07:21 PM      Troponin 07/31/2022 <0.01         Assessment:   49 y.o. female with planned surgery as above. - Known risk factors for perioperative complications: None   - Difficulty with intubation is not anticipated. - Current medications which may produce withdrawal symptoms if withheld perioperatively: none     Plan:   1. Preoperative workup as follows Reviewedrecent EKG, CBC, CMP. All normal   2. Change in medication regimen before surgery: none, continue med regimen including morning of surgery, w/sip of water   Pt instructed to avoid NSAIDs, ASA, MV, Vitamin E, Fish oil 1 week prior to surgery to decrease bleeding risk. 3. Prophylaxis for cardiac events with perioperative beta-blockers: not indicated   4. Invasive hemodynamic monitoring perioperatively: not indicated   5. Deep vein thrombosis prophylaxis postoperatively:regimen to be chosen by surgical team   6. Other measures: none      1. Pre-op examination  2. Cholecystitis      While assessing care for this patient, I have reviewed all pertinent lab work/imaging/ specialist notes and care in reference to those problems addressed above in detail. Appropriate medical decision making was based on this. Please note that portions of this note may have been completed with a voice recognition program. Efforts were made to edit the dictations but occasionally words are mis-transcribed.       Pt is at an acceptable risk for planned procedure    Please call with any questions  Sai Graves, DO

## 2022-10-17 ENCOUNTER — TELEPHONE (OUTPATIENT)
Dept: FAMILY MEDICINE CLINIC | Age: 47
End: 2022-10-17

## 2022-10-17 NOTE — TELEPHONE ENCOUNTER
Attempted to contact the patient to advise her Cologuard Kit order will . Advised a new order will be placed and she will receive a new kit.

## 2022-11-14 DIAGNOSIS — F41.1 GAD (GENERALIZED ANXIETY DISORDER): ICD-10-CM

## 2022-11-14 DIAGNOSIS — F33.2 SEVERE EPISODE OF RECURRENT MAJOR DEPRESSIVE DISORDER, WITHOUT PSYCHOTIC FEATURES (HCC): ICD-10-CM

## 2022-11-15 RX ORDER — VENLAFAXINE HYDROCHLORIDE 150 MG/1
CAPSULE, EXTENDED RELEASE ORAL
Qty: 90 CAPSULE | Refills: 1 | Status: SHIPPED | OUTPATIENT
Start: 2022-11-15

## 2022-11-15 NOTE — TELEPHONE ENCOUNTER
Refill Request     CONFIRM preferrred pharmacy with the patient. If Mail Order Rx - Pend for 90 day refill. Last Seen: Last Seen Department: 8/25/2022  Last Seen by PCP: 11/29/2021    Last Written: 8/25/2022    If no future appointment scheduled, route STAFF MESSAGE with patient name to the [facility] for scheduling. Next Appointment:   No future appointments. Message sent to [address] [place] [address] to schedule appt with patient?   NO      Requested Prescriptions     Pending Prescriptions Disp Refills    venlafaxine (EFFEXOR XR) 150 MG extended release capsule [Pharmacy Med Name: VENLAFAXINE HCL ER CAPS 150MG] 90 capsule 3     Sig: TAKE 1 CAPSULE DAILY

## 2023-01-30 ENCOUNTER — HOSPITAL ENCOUNTER (INPATIENT)
Age: 48
LOS: 5 days | Discharge: HOME | End: 2023-02-04
Attending: STUDENT IN AN ORGANIZED HEALTH CARE EDUCATION/TRAINING PROGRAM | Admitting: PSYCHIATRY & NEUROLOGY
Payer: COMMERCIAL

## 2023-01-30 DIAGNOSIS — R45.851 SUICIDAL IDEATION: Primary | ICD-10-CM

## 2023-01-30 PROBLEM — F33.9 MAJOR DEPRESSION, RECURRENT (HCC): Status: ACTIVE | Noted: 2023-01-30

## 2023-01-30 PROBLEM — F32.A DEPRESSION, UNSPECIFIED DEPRESSION TYPE: Status: ACTIVE | Noted: 2023-01-30

## 2023-01-30 LAB
A/G RATIO: 2 (ref 1.1–2.2)
ACETAMINOPHEN LEVEL: <5 UG/ML (ref 10–30)
ALBUMIN SERPL-MCNC: 4.7 G/DL (ref 3.4–5)
ALP BLD-CCNC: 128 U/L (ref 40–129)
ALT SERPL-CCNC: 94 U/L (ref 10–40)
AMPHETAMINE SCREEN, URINE: ABNORMAL
ANION GAP SERPL CALCULATED.3IONS-SCNC: 11 MMOL/L (ref 3–16)
AST SERPL-CCNC: 31 U/L (ref 15–37)
BARBITURATE SCREEN URINE: ABNORMAL
BASOPHILS ABSOLUTE: 0 K/UL (ref 0–0.2)
BASOPHILS RELATIVE PERCENT: 0.8 %
BENZODIAZEPINE SCREEN, URINE: ABNORMAL
BILIRUB SERPL-MCNC: 0.4 MG/DL (ref 0–1)
BUN BLDV-MCNC: 8 MG/DL (ref 7–20)
CALCIUM SERPL-MCNC: 8.6 MG/DL (ref 8.3–10.6)
CANNABINOID SCREEN URINE: POSITIVE
CHLORIDE BLD-SCNC: 105 MMOL/L (ref 99–110)
CO2: 23 MMOL/L (ref 21–32)
COCAINE METABOLITE SCREEN URINE: ABNORMAL
CREAT SERPL-MCNC: 0.6 MG/DL (ref 0.6–1.1)
EOSINOPHILS ABSOLUTE: 0 K/UL (ref 0–0.6)
EOSINOPHILS RELATIVE PERCENT: 1.1 %
ETHANOL: NORMAL MG/DL (ref 0–0.08)
FENTANYL SCREEN, URINE: ABNORMAL
GFR SERPL CREATININE-BSD FRML MDRD: >60 ML/MIN/{1.73_M2}
GLUCOSE BLD-MCNC: 99 MG/DL (ref 70–99)
HCG QUALITATIVE: NEGATIVE
HCT VFR BLD CALC: 41.6 % (ref 36–48)
HEMOGLOBIN: 14 G/DL (ref 12–16)
INFLUENZA A: NOT DETECTED
INFLUENZA B: NOT DETECTED
LYMPHOCYTES ABSOLUTE: 1 K/UL (ref 1–5.1)
LYMPHOCYTES RELATIVE PERCENT: 25 %
Lab: ABNORMAL
MCH RBC QN AUTO: 28.8 PG (ref 26–34)
MCHC RBC AUTO-ENTMCNC: 33.7 G/DL (ref 31–36)
MCV RBC AUTO: 85.4 FL (ref 80–100)
METHADONE SCREEN, URINE: ABNORMAL
MONOCYTES ABSOLUTE: 0.4 K/UL (ref 0–1.3)
MONOCYTES RELATIVE PERCENT: 9.7 %
NEUTROPHILS ABSOLUTE: 2.7 K/UL (ref 1.7–7.7)
NEUTROPHILS RELATIVE PERCENT: 63.4 %
OPIATE SCREEN URINE: ABNORMAL
OXYCODONE URINE: ABNORMAL
PDW BLD-RTO: 14.2 % (ref 12.4–15.4)
PH UA: 6
PHENCYCLIDINE SCREEN URINE: POSITIVE
PLATELET # BLD: 155 K/UL (ref 135–450)
PMV BLD AUTO: 9.2 FL (ref 5–10.5)
POTASSIUM REFLEX MAGNESIUM: 3.6 MMOL/L (ref 3.5–5.1)
RBC # BLD: 4.87 M/UL (ref 4–5.2)
SALICYLATE, SERUM: <0.3 MG/DL (ref 15–30)
SARS-COV-2 RNA, RT PCR: NOT DETECTED
SODIUM BLD-SCNC: 139 MMOL/L (ref 136–145)
TOTAL PROTEIN: 7 G/DL (ref 6.4–8.2)
TSH SERPL DL<=0.05 MIU/L-ACNC: 4.89 UIU/ML (ref 0.27–4.2)
WBC # BLD: 4.2 K/UL (ref 4–11)

## 2023-01-30 PROCEDURE — 84703 CHORIONIC GONADOTROPIN ASSAY: CPT

## 2023-01-30 PROCEDURE — 87636 SARSCOV2 & INF A&B AMP PRB: CPT

## 2023-01-30 PROCEDURE — 93005 ELECTROCARDIOGRAM TRACING: CPT | Performed by: PSYCHIATRY & NEUROLOGY

## 2023-01-30 PROCEDURE — 80143 DRUG ASSAY ACETAMINOPHEN: CPT

## 2023-01-30 PROCEDURE — 80179 DRUG ASSAY SALICYLATE: CPT

## 2023-01-30 PROCEDURE — 82077 ASSAY SPEC XCP UR&BREATH IA: CPT

## 2023-01-30 PROCEDURE — 99285 EMERGENCY DEPT VISIT HI MDM: CPT

## 2023-01-30 PROCEDURE — 84443 ASSAY THYROID STIM HORMONE: CPT

## 2023-01-30 PROCEDURE — 83036 HEMOGLOBIN GLYCOSYLATED A1C: CPT

## 2023-01-30 PROCEDURE — 80053 COMPREHEN METABOLIC PANEL: CPT

## 2023-01-30 PROCEDURE — 6370000000 HC RX 637 (ALT 250 FOR IP): Performed by: PSYCHIATRY & NEUROLOGY

## 2023-01-30 PROCEDURE — 80307 DRUG TEST PRSMV CHEM ANLYZR: CPT

## 2023-01-30 PROCEDURE — 84439 ASSAY OF FREE THYROXINE: CPT

## 2023-01-30 PROCEDURE — 80061 LIPID PANEL: CPT

## 2023-01-30 PROCEDURE — 1240000000 HC EMOTIONAL WELLNESS R&B

## 2023-01-30 PROCEDURE — 36415 COLL VENOUS BLD VENIPUNCTURE: CPT

## 2023-01-30 PROCEDURE — 6370000000 HC RX 637 (ALT 250 FOR IP): Performed by: STUDENT IN AN ORGANIZED HEALTH CARE EDUCATION/TRAINING PROGRAM

## 2023-01-30 PROCEDURE — 85025 COMPLETE CBC W/AUTO DIFF WBC: CPT

## 2023-01-30 RX ORDER — ONDANSETRON 4 MG/1
4 TABLET, ORALLY DISINTEGRATING ORAL EVERY 8 HOURS PRN
Status: DISCONTINUED | OUTPATIENT
Start: 2023-01-30 | End: 2023-02-04 | Stop reason: HOSPADM

## 2023-01-30 RX ORDER — MAGNESIUM HYDROXIDE/ALUMINUM HYDROXICE/SIMETHICONE 120; 1200; 1200 MG/30ML; MG/30ML; MG/30ML
30 SUSPENSION ORAL EVERY 6 HOURS PRN
Status: DISCONTINUED | OUTPATIENT
Start: 2023-01-30 | End: 2023-02-04 | Stop reason: HOSPADM

## 2023-01-30 RX ORDER — TRAZODONE HYDROCHLORIDE 50 MG/1
50 TABLET ORAL NIGHTLY PRN
Status: DISCONTINUED | OUTPATIENT
Start: 2023-01-30 | End: 2023-01-30

## 2023-01-30 RX ORDER — VENLAFAXINE HYDROCHLORIDE 75 MG/1
150 CAPSULE, EXTENDED RELEASE ORAL NIGHTLY
Status: DISCONTINUED | OUTPATIENT
Start: 2023-01-30 | End: 2023-02-04 | Stop reason: HOSPADM

## 2023-01-30 RX ORDER — HYDROXYZINE 50 MG/1
50 TABLET, FILM COATED ORAL 3 TIMES DAILY PRN
Status: DISCONTINUED | OUTPATIENT
Start: 2023-01-30 | End: 2023-02-04 | Stop reason: HOSPADM

## 2023-01-30 RX ORDER — BENZTROPINE MESYLATE 1 MG/ML
2 INJECTION INTRAMUSCULAR; INTRAVENOUS 2 TIMES DAILY PRN
Status: DISCONTINUED | OUTPATIENT
Start: 2023-01-30 | End: 2023-02-04 | Stop reason: HOSPADM

## 2023-01-30 RX ORDER — POLYETHYLENE GLYCOL 3350 17 G
2 POWDER IN PACKET (EA) ORAL
Status: DISCONTINUED | OUTPATIENT
Start: 2023-01-30 | End: 2023-02-04 | Stop reason: HOSPADM

## 2023-01-30 RX ORDER — TRAZODONE HYDROCHLORIDE 50 MG/1
50 TABLET ORAL NIGHTLY PRN
Status: DISCONTINUED | OUTPATIENT
Start: 2023-01-30 | End: 2023-02-04 | Stop reason: HOSPADM

## 2023-01-30 RX ORDER — OLANZAPINE 10 MG/1
10 TABLET ORAL EVERY 4 HOURS PRN
Status: DISCONTINUED | OUTPATIENT
Start: 2023-01-30 | End: 2023-02-04 | Stop reason: HOSPADM

## 2023-01-30 RX ORDER — IBUPROFEN 400 MG/1
400 TABLET ORAL EVERY 6 HOURS PRN
Status: DISCONTINUED | OUTPATIENT
Start: 2023-01-30 | End: 2023-02-04 | Stop reason: HOSPADM

## 2023-01-30 RX ORDER — LORAZEPAM 0.5 MG/1
0.5 TABLET ORAL
Status: COMPLETED | OUTPATIENT
Start: 2023-01-30 | End: 2023-01-30

## 2023-01-30 RX ORDER — ACETAMINOPHEN 325 MG/1
650 TABLET ORAL EVERY 4 HOURS PRN
Status: DISCONTINUED | OUTPATIENT
Start: 2023-01-30 | End: 2023-02-04 | Stop reason: HOSPADM

## 2023-01-30 RX ORDER — NICOTINE 21 MG/24HR
1 PATCH, TRANSDERMAL 24 HOURS TRANSDERMAL DAILY
Status: DISCONTINUED | OUTPATIENT
Start: 2023-01-30 | End: 2023-02-04

## 2023-01-30 RX ADMIN — LORAZEPAM 0.5 MG: 0.5 TABLET ORAL at 12:19

## 2023-01-30 RX ADMIN — VENLAFAXINE HYDROCHLORIDE 150 MG: 75 CAPSULE, EXTENDED RELEASE ORAL at 20:56

## 2023-01-30 RX ADMIN — TRAZODONE HYDROCHLORIDE 50 MG: 50 TABLET ORAL at 21:57

## 2023-01-30 ASSESSMENT — LIFESTYLE VARIABLES
HOW MANY STANDARD DRINKS CONTAINING ALCOHOL DO YOU HAVE ON A TYPICAL DAY: 1 OR 2
HOW OFTEN DO YOU HAVE A DRINK CONTAINING ALCOHOL: MONTHLY OR LESS

## 2023-01-30 ASSESSMENT — SLEEP AND FATIGUE QUESTIONNAIRES
AVERAGE NUMBER OF SLEEP HOURS: 3
SLEEP PATTERN: DIFFICULTY FALLING ASLEEP;DISTURBED/INTERRUPTED SLEEP;RESTLESSNESS
DO YOU HAVE DIFFICULTY SLEEPING: YES
DO YOU USE A SLEEP AID: YES

## 2023-01-30 ASSESSMENT — PATIENT HEALTH QUESTIONNAIRE - PHQ9
SUM OF ALL RESPONSES TO PHQ QUESTIONS 1-9: 27
SUM OF ALL RESPONSES TO PHQ QUESTIONS 1-9: 27

## 2023-01-30 ASSESSMENT — PAIN - FUNCTIONAL ASSESSMENT: PAIN_FUNCTIONAL_ASSESSMENT: NONE - DENIES PAIN

## 2023-01-30 NOTE — ED NOTES
Level of Care Disposition: Admit      Patient was seen by ED provider and [facility] staff. The case presented to psychiatric provider on-call Dr. Shadia Vallecillo. Based on the ED evaluation and information presented to the provider by this writer, it is the recommendation of the on call psychiatric provider that inpatient hospitalization is the least restrictive environment for the patient at this time. The patient will be admitted to the inpatient unit.       [address] [place] [address]  01/30/23 4504

## 2023-01-30 NOTE — PROGRESS NOTES
Patient arrived to the unit via wheelchair accompanied by 2 staff members. She was oriented to her assigned room. Vital signs taken. She was calm and cooperative.

## 2023-01-30 NOTE — ED NOTES
Pt arrived ambulatory with Vilma Sol RN. Pt changed into safety gown and all belongings secured in locker. Will monitor pt for safety.       [address] [place] [address]  01/30/23 5001

## 2023-01-30 NOTE — ED NOTES
Ativan 0.5mg has been effective in decreasing pt's anxiety. Pt resting quietly in treatment room.       Juvnetino Pruett RN  01/30/23 6197

## 2023-01-30 NOTE — ED NOTES
Ativan 0.5mg administered per order for c/o anxiety. Lights turned off to promote rest & relaxation. Pt thanked writer and denied any needs at this time. Will continue to monitor.       Linda Quintanilla RN  01/30/23 8758

## 2023-01-30 NOTE — PLAN OF CARE
[facility]  Admission Note     Admission Type:   Admission Type: Voluntary    Reason for admission:  Reason for Admission: Suicidal Ideation      Addictive Behavior:   Addictive Behavior  In the Past 3 Months, Have You Felt or Has Someone Told You That You Have a Problem With  : None    Medical Problems:   Past Medical History:   Diagnosis Date    Anxiety     Depression     Strep sore throat        Status EXAM:  Mental Status and Behavioral Exam  Normal: No  Level of Assistance: Independent/Self  Facial Expression: Avoids Gaze, Flat, Sad, Worried  Affect: Appropriate  Level of Consciousness: Alert  Frequency of Checks: 4 times per hour, close  Mood:Normal: No  Mood: Depressed, Anxious, Worthless, low self-esteem, Sad  Motor Activity:Normal: No  Motor Activity: Decreased  Eye Contact: Fair  Observed Behavior: Cooperative  Sexual Misconduct History: Current - no  Preception: Rock to person, Rock to time, Rock to place, Rock to situation  Attention:Normal: Yes  Thought Processes: Other (comment) (linear)  Thought Content:Normal: No  Thought Content: Poverty of content  Depression Symptoms: Change in energy level, Crying, Feelings of helplessness, Feelings of hopelessess, Feelings of worthlessness, Impaired concentration, Loss of interest, Sleep disturbance  Anxiety Symptoms: Generalized  Pati Symptoms: No problems reported or observed.   Hallucinations: None  Delusions: No  Memory:Normal: No  Memory: Poor recent  Insight and Judgment: No  Insight and Judgment: Poor judgment, Poor insight    Tobacco Screening:  Practical Counseling, on admission, gilberto X, if applicable and completed (first 3 are required if patient doesn't refuse):            ( ) Recognizing danger situations (included triggers and roadblocks)                    ( ) Coping skills (new ways to manage stress,relaxation techniques, changing routine, distraction)                                                           ( ) Basic information about quitting (benefits of quitting, techniques in how to quit, available resources  ( ) Referral for counseling faxed to [facility]                                                                                                                   ( ) Patient refused counseling  ( x) Patient has not smoked in the last 30 days    Metabolic Screening:    No results found for: LABA1C    Lab Results   Component Value Date    CHOL 189 11/29/2021    CHOL 159 04/29/2015    CHOL 147 10/29/2013    CHOL 158 09/04/2012     Lab Results   Component Value Date    TRIG 111 11/29/2021    TRIG 70 04/29/2015    TRIG 61 10/29/2013    TRIG 47 09/04/2012     Lab Results   Component Value Date    HDL 43 11/29/2021    HDL 53 04/29/2015    HDL 50 10/29/2013    HDL 50 09/04/2012     No components found for: LDLCAL  Lab Results   Component Value Date    LABVLDL 22 11/29/2021    LABVLDL 14 04/29/2015    LABVLDL 12 10/29/2013    LABVLDL 9 09/04/2012         Body mass index is 29.23 kg/m². BP Readings from Last 2 Encounters:   01/30/23 118/73   08/25/22 104/72           Pt admitted with followings belongings:  Dental Appliances: None  Vision - Corrective Lenses: Eyeglasses  Hearing Aid: None  Jewelry: None  Body Piercings Removed: N/A  Clothing: Jacket/Coat, Pants, Undergarments, Shirt, Socks, Footwear  Other Valuables: Money, Wallet, Keys (22.00 cash)    Patient was calm and cooperative with interview process. All consents signed.    Stella Rdz RN

## 2023-01-30 NOTE — ED NOTES
Presenting Problem: Patient presents to [facility] voluntarily after she brought herself in. Pt reports frequent thoughts of suicide over the last couple weeks. She reports now thinking about acting on these thoughts daily and knew she needed to come in to get help. Pt states she is having thoughts to overdose on pills or wreck her car into a utility pole. She states, \"this is the closest I've ever come to it. I'm so scared I'm going to\". Pt reports increased negative, racing thoughts and poor sleep, stating, \"I just can't shut my mind off\". She reports about 3-4 hours of sleep per night which she describes as abnormal. She has been increasingly overwhelmed at work and reports difficulty functioning. She was excessively tearful throughout assessment and almost hysterical at times when discussing life stressors. Pt repeated several times, \"I just have no purpose\". Her two adult children have moved out and her 15 y/o daughter will no longer come to visit due to pt's mental health issues. Pt states she feels she has no reason to live and no longer wants to live. She feels she will act on these thoughts if discharged and states, \"I'll do whatever it takes to feel better\". Appearance/Hygiene:  well-appearing, in chair, good grooming, and good hygiene   Motor Behavior: WNL   Attitude: cooperative  Affect: anxiety   Speech: normal pitch and normal volume  Mood: anxious, labile, and sad   Thought Processes: Goal directed  Perceptions: Absent   Thought content: helplessness, hopelessness, suicidal ideation   Orientation: A&Ox4   Memory: intact  Concentration: Fair    Insight/ judgement: poor judgment      Psychosocial and contextual factors: Pt lives in a home with her fiance and reports adequate support. 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 She reports poor functioning at work. She has two adult children and a 15 y/o daughter.  She went through a divorce two years ago and lost custody of her daughter due to her third shift job. She has since regained shared custody but does not see her daughter often because her daughter does not want to visit. She describes the relationship with her daughter and her ex as poor. C-SSRS flowsheet is complete. Psychiatric History (including current outpatient provider and past inpatient admissions): Pt denies all hx of suicide attempts. She does report hx of self harm with most recent episode a couple of years ago by cutting and burning. She denies all hx of psychiatric hospitalizations. She denies current outpt tx and has current medication management from her PCP. She was prescribed Effexor 225 mg but after she changed insurance last year, it only covered the Effexor 150 mg so she has been taking that dose for about a year and does not feel it is helping. She uses marijuana and has her medical marijuana card. Her PCP recommended that she have her IUD removed due to concern that it was contributing to hormone and mood instability, however, pt refused stating she does not want to take the risk of pregnancy. Access to Firearms: Pt has access to guns in the home. She denies this as a plan for suicide and states, \"my cousin did that.  I would never want anyone to find me like that\"    ASSESSMENT FOR IMMINENT FUTURE DANGER:    RISK FACTORS:    []  Age <25 or >49   []  Male gender   [x]  Depressed mood   [x]  Active suicidal ideation   [x]  Suicide plan   []  Suicide attempt   [x]  Access to lethal means   []  Prior suicide attempt   []  Active substance abuse    []  Highly impulsive behaviors   []  Not attending to self-care/ADLs    []  Recent significant loss   []  Chronic pain or medical illness   []  Social isolation   []  History of violence    []  Active psychosis   []  Cognitive impairment    [x]  No outpatient services in place   []  Medication noncompliance   [x]  No collateral information to support safety  [] Self- injurious/ Self-harm behavior    PROTECTIVE FACTORS:  [x] Age >25 and <55  [x] Female gender   [] Denies depression  [] Denies suicidal ideation  [] Does not have lethal plan   [] Does not have access to guns or weapons  [] Patient is verbally sindi for safety  [x] No prior suicide attempts  [] No active substance abuse  [x] Patient has social or family support  [] No active psychosis or cognitive dysfunction  [x] Physically healthy  [] Has outpatient services in place  [] Compliant with recommended medications  [] Collateral information from. .. supports patient safety   [] Patient is future oriented with plans to. ..           [address] [place] [address]  01/30/23 [facility]4

## 2023-01-30 NOTE — ED PROVIDER NOTES
[address] ED  EMERGENCY DEPARTMENT ENCOUNTER        Patient Name: aMyo Sue  MRN: [id number]  [place] 1975  Date of evaluation: 1/30/2023  Provider: Naz Gonzalez MD  PCP: Laxmi Figueroa DO  Note Started: 12:05 PM EST 1/30/23      CHIEF COMPLAINT  Psychiatric Evaluation         HISTORY & PHYSICAL     HISTORY OF PRESENT ILLNESS  History from : Patient    Limitations to history : None    Mayo Sue is a 49 y.o. female  has a past medical history of Anxiety, Depression, and Strep sore throat., who presents to the ED complaining of suicidal ideation. Suicidal ideation: yes  Plan: wrecking car or OD  Previous attempts: denies  Homicidal ideation: denies  Access to firearms: yes  Audiovisual hallucinations: denies  Psychiatric medications: effexor  Tobacco use: denies  Alcohol use: denies  Illicit drug use: marijuana    Somatic complaints: Denies    Old records reviewed: No pertinent information noted. No other complaints, modifying factors or associated symptoms. Nursing Notes were all reviewed and agreed with or any disagreements were addressed in the HPI. I have reviewed the following from the nursing documentation. Past Medical History:   Diagnosis Date    Anxiety     Depression     Strep sore throat      Past Surgical History:   Procedure Laterality Date    BLADDER SUSPENSION      COLONOSCOPY  6-1-15    no polyps.  normal.    COSMETIC SURGERY       Family History   Problem Relation Age of Onset    Depression Mother     Depression Sister     Cancer Maternal Aunt         lung     Social History     Socioeconomic History    Marital status:      Spouse name: Not on file    Number of children: 3    Years of education: Not on file    Highest education level: Not on file   Occupational History    Occupation: office work   Tobacco Use    Smoking status: Never    Smokeless tobacco: Never   Substance and Sexual Activity    Alcohol use: Yes     Comment: occ    Drug use: No    Sexual activity: Not on file   Other Topics Concern    Not on file   Social History Narrative    Walks the dog leisurely pace, healthy diet, walks on treadmill     Social Determinants of Health     Financial Resource Strain: Not on file   Food Insecurity: Not on file   Transportation Needs: Not on file   Physical Activity: Not on file   Stress: Not on file   Social Connections: Not on file   Intimate Partner Violence: Not on file   Housing Stability: Not on file     No current facility-administered medications for this encounter. Current Outpatient Medications   Medication Sig Dispense Refill    venlafaxine (EFFEXOR XR) 150 MG extended release capsule TAKE 1 CAPSULE DAILY 90 capsule 1    dicyclomine (BENTYL) 10 MG capsule Take 1 capsule by mouth every 6 hours as needed (cramps) 20 capsule 0    ondansetron (ZOFRAN ODT) 4 MG disintegrating tablet Take 1 tablet by mouth every 8 hours as needed for Nausea 20 tablet 0    metoclopramide (REGLAN) 10 MG tablet Take 1 tablet by mouth in the morning and 1 tablet at noon and 1 tablet in the evening and 1 tablet before bedtime. WARNING:  May cause drowsiness. May impair ability to operate vehicles or machinery. Do not use in combination with alcohol. . 20 tablet 0    ondansetron (ZOFRAN ODT) 4 MG disintegrating tablet Take 1 tablet by mouth every 8 hours as needed for Nausea or Vomiting 20 tablet 0    famotidine (PEPCID) 20 MG tablet Take 1 tablet by mouth in the morning and 1 tablet before bedtime.  60 tablet 3    venlafaxine (EFFEXOR XR) 75 MG extended release capsule TAKE 1 CAPSULE BY MOUTH EVERY DAY 90 capsule 3    busPIRone (BUSPAR) 10 MG tablet Take 1-1.5 tablets by mouth 3 times daily as needed (anxiety) (Patient not taking: Reported on 8/25/2022) 30 tablet 5    ibuprofen (ADVIL;MOTRIN) 400 MG tablet Take 1 tablet by mouth every 6 hours as needed for Pain 20 tablet 0    levonorgestrel (MIRENA) IUD 52 mg 1 each by Intrauterine route once       Allergies Allergen Reactions    Penicillins Hives       REVIEW OF SYSTEMS  All systems reviewed, pertinent positives per HPI otherwise noted to be negative. PHYSICAL EXAM  ED Triage Vitals [01/30/23 1108]   BP Temp Temp Source Heart Rate Resp SpO2 Height Weight   (!) 153/99 97.3 °F (36.3 °C) Oral 86 18 98 % -- --     GENERAL APPEARANCE: Awake and alert. Cooperative. no distress. HENT: Normocephalic. Atraumatic. Mucous membranes are moist  NECK: Supple. Full range of motion of the neck without stiffness or pain. EYES: PERRL. EOM's grossly intact. HEART/CHEST: RRR. No murmurs. LUNGS: Respirations unlabored. CTAB. Good air exchange. Speaking comfortably in full sentences. ABDOMEN: No tenderness. Soft. Non-distended. No masses. No organomegaly. No guarding or rebound. MUSCULOSKELETAL: No extremity edema. Compartments soft. No deformity. No tenderness in the extremities. All extremities neurovascularly intact. SKIN: Warm and dry. No acute rashes. NEUROLOGICAL: Alert and oriented. No gross facial drooping. Strength 5/5, sensation intact. PSYCHIATRIC: Flat affect, depressed mood, suicidal ideation. Does not appear to be responding to internal stimuli. WORKUP     LABS  I have reviewed all labs for this visit.    Results for orders placed or performed during the hospital encounter of 01/30/23   CBC with Auto Differential   Result Value Ref Range    WBC 4.2 4.0 - 11.0 K/uL    RBC 4.87 4.00 - 5.20 M/uL    Hemoglobin 14.0 12.0 - 16.0 g/dL    Hematocrit 41.6 36.0 - 48.0 %    MCV 85.4 80.0 - 100.0 fL    MCH 28.8 26.0 - 34.0 pg    MCHC 33.7 31.0 - 36.0 g/dL    RDW 14.2 12.4 - 15.4 %    Platelets 706 233 - 126 K/uL    MPV 9.2 5.0 - 10.5 fL    Neutrophils % 63.4 %    Lymphocytes % 25.0 %    Monocytes % 9.7 %    Eosinophils % 1.1 %    Basophils % 0.8 %    Neutrophils Absolute 2.7 1.7 - 7.7 K/uL    Lymphocytes Absolute 1.0 1.0 - 5.1 K/uL    Monocytes Absolute 0.4 0.0 - 1.3 K/uL    Eosinophils Absolute 0.0 0.0 - 0.6 K/uL    Basophils Absolute 0.0 0.0 - 0.2 K/uL   CMP w/ Reflex to MG   Result Value Ref Range    Sodium 139 136 - 145 mmol/L    Potassium reflex Magnesium 3.6 3.5 - 5.1 mmol/L    Chloride 105 99 - 110 mmol/L    CO2 23 21 - 32 mmol/L    Anion Gap 11 3 - 16    Glucose 99 70 - 99 mg/dL    BUN 8 7 - 20 mg/dL    Creatinine 0.6 0.6 - 1.1 mg/dL    Est, Glom Filt Rate >60 >60    Calcium 8.6 8.3 - 10.6 mg/dL    Total Protein 7.0 6.4 - 8.2 g/dL    Albumin 4.7 3.4 - 5.0 g/dL    Albumin/Globulin Ratio 2.0 1.1 - 2.2    Total Bilirubin 0.4 0.0 - 1.0 mg/dL    Alkaline Phosphatase 128 40 - 129 U/L    ALT 94 (H) 10 - 40 U/L    AST 31 15 - 37 U/L   ETOH   Result Value Ref Range    Ethanol Lvl None Detected mg/dL   Acetaminophen Level   Result Value Ref Range    Acetaminophen Level <5 (L) 10 - 30 ug/mL   Salicylate   Result Value Ref Range    Salicylate, Serum <3.2 (L) 15.0 - 30.0 mg/dL   Drug screen multi urine   Result Value Ref Range    Amphetamine Screen, Urine Neg Negative <1000ng/mL    Barbiturate Screen, Ur Neg Negative <200 ng/mL    Benzodiazepine Screen, Urine Neg Negative <200 ng/mL    Cannabinoid Scrn, Ur POSITIVE (A) Negative <50 ng/mL    Cocaine Metabolite Screen, Urine Neg Negative <300 ng/mL    Opiate Scrn, Ur Neg Negative <300 ng/mL    PCP Screen, Urine POSITIVE (A) Negative <25 ng/mL    Methadone Screen, Urine Neg Negative <300 ng/mL    Oxycodone Urine Neg Negative <100 ng/ml    FENTANYL SCREEN, URINE Neg Negative <5 ng/mL    Drug Screen Comment: see below    hCG, serum, qualitative   Result Value Ref Range    hCG Qual Negative Detects HCG level >10 MIU/mL         EMERGENCY DEPARTMENT COURSE and DIFFERENTIAL DIAGNOSIS/MDM:   Patient seen and evaluated. Old records reviewed and pertinent information included in HPI. Labs and imaging reviewed and results discussed with patient. Overall well appearing patient, in no acute distress, presenting for suicidal ideation. History obtained from patient.  Physical exam remarkable for flat depressed affect and mood. Patient's pertinent external medical records: Records Reviewed : None    Chronic Medical Conditions that may contribute to presentation today:  has a past medical history of Anxiety, Depression, and Strep sore throat. Social Determinants affecting Dx or Tx:   Social History     Socioeconomic History    Marital status:      Spouse name: Not on file    Number of children: 3    Years of education: Not on file    Highest education level: Not on file   Occupational History    Occupation: office work   Tobacco Use    Smoking status: Never    Smokeless tobacco: Never   Substance and Sexual Activity    Alcohol use: Yes     Comment: occ    Drug use: No    Sexual activity: Not on file   Other Topics Concern    Not on file   Social History Narrative    Walks the dog leisurely pace, healthy diet, walks on treadmill     Social Determinants of Health     Financial Resource Strain: Not on file   Food Insecurity: Not on file   Transportation Needs: Not on file   Physical Activity: Not on file   Stress: Not on file   Social Connections: Not on file   Intimate Partner Violence: Not on file   Housing Stability: Not on file     WORKUP INTERPRETATION:  ED Course as of 01/30/23 1250   Mon Jan 30, 2023 1959 Ethanol, salicylate, and acetaminophen levels negative [ER]   1217 Urine drug screen positive for cannabinoids and PCP. Patient has had a cold and has been using cold medicine, suspect this is the cause of positive PCP. She does endorse cannabis use. [ER]   1217 Patient is not pregnant [ER]   1218 No electrolyte abnormalities or evidence of kidney dysfunction. [ER]   1218 Liver function testing shows ALT elevation, no right upper quadrant pain or tenderness to palpation. [ER]   1218 No leukocytosis, anemia, thrombocytopenia [ER]   1246 Flu and COVID swab negative.  [ER]   [phone] Patient is medically cleared for psychiatric evaluation. [ER]      ED Course User Index  [ER] Rufus Vora MD        CONSULTS:     -Management of the patient was discussed with behavioral health provider who chose to admit the patient    SCREENINGS:       Poth Coma Scale  Eye Opening: Spontaneous  Best Verbal Response: Oriented                CIWA Assessment  BP: (!) 153/99  Heart Rate: 86           Is this patient to be included in the SEP-1 Core Measure due to severe sepsis or septic shock? No   Exclusion criteria - the patient is NOT to be included for SEP-1 Core Measure due to:  2+ SIRS criteria are not met      TREATMENT:  During the patient's ED course, the patient was given:  Medications   LORazepam (ATIVAN) tablet 0.5 mg (0.5 mg Oral Given 1/30/23 1219)        REASSESSMENT:  I have performed a medical clearance examination on this patient. It is my opinion that no medical conditions were discovered that would preclude admission to a behavioral health unit or discharge home. I feel that the patient is medically stable for disposition by the behavioral health team at this time. Psychiatry social work evaluated the patient and staffed the case with on-call psychiatry team. Decision made to admit the patient. Vitals:    Vitals:    01/30/23 1108   BP: (!) 153/99   Pulse: 86   Resp: 18   Temp: 97.3 °F (36.3 °C)   TempSrc: Oral   SpO2: 98%       CRITICAL CARE TIME     I personally spent a total of 0 minutes of critical care time in obtaining history, performing a physical exam, bedside monitoring of interventions, collecting and interpreting tests and discussion with consultants but excluding time spent performing procedures, treating other patients and teaching time. FINAL IMPRESSION      1. Suicidal ideation          DISPOSITION/PLAN   Disposition: DISPOSITION  Admitted    Condition: stable     DISCLAIMER: This chart was created using Dragon dictation software.   Efforts were made by me to ensure accuracy, however some errors may be present due to limitations of this technology and occasionally words are not transcribed correctly.     MD Antwan Lynch MD  01/31/23 551 Kehinde Mccain MD  01/31/23 3795

## 2023-01-30 NOTE — ED NOTES
Urine, blood and COVID specimens collected and sent to lab. ANA MARÍA process explained and pt verbalized her understanding. Ice water provided.       Shannon Cam RN  01/30/23 7921

## 2023-01-31 PROBLEM — F41.1 GAD (GENERALIZED ANXIETY DISORDER): Status: ACTIVE | Noted: 2023-01-31

## 2023-01-31 PROBLEM — R79.89 ELEVATED TSH: Status: ACTIVE | Noted: 2023-01-31

## 2023-01-31 PROBLEM — R05.1 ACUTE COUGH: Status: ACTIVE | Noted: 2023-01-31

## 2023-01-31 PROBLEM — R74.01 ELEVATED ALT MEASUREMENT: Status: ACTIVE | Noted: 2023-01-31

## 2023-01-31 PROBLEM — Z00.00 ENCOUNTER FOR GENERAL MEDICAL EXAMINATION: Status: ACTIVE | Noted: 2023-01-31

## 2023-01-31 PROBLEM — R45.851 SUICIDAL IDEATION: Status: ACTIVE | Noted: 2023-01-31

## 2023-01-31 PROBLEM — F12.90 CANNABIS USE, UNCOMPLICATED: Status: ACTIVE | Noted: 2023-01-31

## 2023-01-31 PROBLEM — F32.A DEPRESSION, UNSPECIFIED DEPRESSION TYPE: Status: RESOLVED | Noted: 2023-01-30 | Resolved: 2023-01-31

## 2023-01-31 PROBLEM — F33.2 SEVERE EPISODE OF RECURRENT MAJOR DEPRESSIVE DISORDER, WITHOUT PSYCHOTIC FEATURES (HCC): Status: ACTIVE | Noted: 2023-01-30

## 2023-01-31 LAB
CHOLESTEROL, TOTAL: 168 MG/DL (ref 0–199)
EKG ATRIAL RATE: 63 BPM
EKG DIAGNOSIS: NORMAL
EKG P AXIS: 46 DEGREES
EKG P-R INTERVAL: 146 MS
EKG Q-T INTERVAL: 412 MS
EKG QRS DURATION: 90 MS
EKG QTC CALCULATION (BAZETT): 421 MS
EKG R AXIS: 65 DEGREES
EKG T AXIS: 50 DEGREES
EKG VENTRICULAR RATE: 63 BPM
HDLC SERPL-MCNC: 36 MG/DL (ref 40–60)
LDL CHOLESTEROL CALCULATED: 101 MG/DL
T4 FREE: 1 NG/DL (ref 0.9–1.8)
TRIGL SERPL-MCNC: 155 MG/DL (ref 0–150)
VLDLC SERPL CALC-MCNC: 31 MG/DL

## 2023-01-31 PROCEDURE — 1240000000 HC EMOTIONAL WELLNESS R&B

## 2023-01-31 PROCEDURE — 6370000000 HC RX 637 (ALT 250 FOR IP): Performed by: PSYCHIATRY & NEUROLOGY

## 2023-01-31 PROCEDURE — 93010 ELECTROCARDIOGRAM REPORT: CPT | Performed by: INTERNAL MEDICINE

## 2023-01-31 PROCEDURE — 99222 1ST HOSP IP/OBS MODERATE 55: CPT

## 2023-01-31 PROCEDURE — 6370000000 HC RX 637 (ALT 250 FOR IP)

## 2023-01-31 RX ORDER — GUAIFENESIN 600 MG/1
600 TABLET, EXTENDED RELEASE ORAL 2 TIMES DAILY
Status: DISCONTINUED | OUTPATIENT
Start: 2023-01-31 | End: 2023-02-04 | Stop reason: HOSPADM

## 2023-01-31 RX ORDER — BENZONATATE 100 MG/1
100 CAPSULE ORAL 3 TIMES DAILY PRN
Status: DISCONTINUED | OUTPATIENT
Start: 2023-01-31 | End: 2023-02-04 | Stop reason: HOSPADM

## 2023-01-31 RX ORDER — ARIPIPRAZOLE 10 MG/1
5 TABLET ORAL DAILY
Status: CANCELLED | OUTPATIENT
Start: 2023-01-31

## 2023-01-31 RX ORDER — VENLAFAXINE HYDROCHLORIDE 75 MG/1
150 CAPSULE, EXTENDED RELEASE ORAL NIGHTLY
Status: CANCELLED | OUTPATIENT
Start: 2023-01-31

## 2023-01-31 RX ADMIN — IBUPROFEN 400 MG: 400 TABLET ORAL at 08:33

## 2023-01-31 RX ADMIN — HYDROXYZINE HYDROCHLORIDE 50 MG: 50 TABLET ORAL at 17:06

## 2023-01-31 RX ADMIN — GUAIFENESIN 600 MG: 600 TABLET, EXTENDED RELEASE ORAL at 20:37

## 2023-01-31 RX ADMIN — GUAIFENESIN 600 MG: 600 TABLET, EXTENDED RELEASE ORAL at 16:41

## 2023-01-31 RX ADMIN — HYDROXYZINE HYDROCHLORIDE 50 MG: 50 TABLET ORAL at 08:32

## 2023-01-31 RX ADMIN — VENLAFAXINE HYDROCHLORIDE 150 MG: 75 CAPSULE, EXTENDED RELEASE ORAL at 20:37

## 2023-01-31 RX ADMIN — ACETAMINOPHEN 650 MG: 325 TABLET ORAL at 20:37

## 2023-01-31 RX ADMIN — TRAZODONE HYDROCHLORIDE 50 MG: 50 TABLET ORAL at 20:37

## 2023-01-31 ASSESSMENT — PAIN DESCRIPTION - ORIENTATION: ORIENTATION: MID

## 2023-01-31 ASSESSMENT — PAIN SCALES - GENERAL
PAINLEVEL_OUTOF10: 5
PAINLEVEL_OUTOF10: 0
PAINLEVEL_OUTOF10: 5

## 2023-01-31 ASSESSMENT — PAIN DESCRIPTION - LOCATION
LOCATION: HEAD
LOCATION: HEAD

## 2023-01-31 ASSESSMENT — PAIN DESCRIPTION - DESCRIPTORS
DESCRIPTORS: ACHING
DESCRIPTORS: ACHING

## 2023-01-31 ASSESSMENT — SLEEP AND FATIGUE QUESTIONNAIRES
SLEEP PATTERN: DIFFICULTY FALLING ASLEEP;DISTURBED/INTERRUPTED SLEEP;RESTLESSNESS
DO YOU HAVE DIFFICULTY SLEEPING: YES
AVERAGE NUMBER OF SLEEP HOURS: 3
DO YOU USE A SLEEP AID: YES

## 2023-01-31 ASSESSMENT — PATIENT HEALTH QUESTIONNAIRE - PHQ9: SUM OF ALL RESPONSES TO PHQ QUESTIONS 1-9: 27

## 2023-01-31 ASSESSMENT — PAIN - FUNCTIONAL ASSESSMENT: PAIN_FUNCTIONAL_ASSESSMENT: ACTIVITIES ARE NOT PREVENTED

## 2023-01-31 NOTE — PROGRESS NOTES
Patient complaining of increased anxiety. Rates her anxiety a 7 on a 1-10 scale. Patient given Hydroxyzine per order.   See [facility]

## 2023-01-31 NOTE — BH NOTE
DANIELLE DIAS [facility]  Treatment Team Note  Review Date & Time: 1/30/23  0935    Patient was not in treatment team      Status EXAM:   Mental Status and Behavioral Exam  Normal: No  Level of Assistance: Independent/Self  Facial Expression: Sad, Worried  Affect: Appropriate  Level of Consciousness: Alert  Frequency of Checks: 4 times per hour, close  Mood:Normal: No  Mood: Depressed, Anxious, Worthless, low self-esteem  Motor Activity:Normal: No  Motor Activity: Decreased  Eye Contact: Fair  Observed Behavior: Cooperative  Sexual Misconduct History: Current - no  Preception: Deal to person, Deal to time, Deal to place, Deal to situation  Attention:Normal: Yes  Thought Processes: Other (comment) (logical/linear)  Thought Content:Normal: No  Thought Content: Poverty of content  Depression Symptoms: Change in energy level, Feelings of helplessness, Feelings of hopelessess, Feelings of worthlessness, Sleep disturbance  Anxiety Symptoms: Generalized  Pati Symptoms: No problems reported or observed. Hallucinations: None  Delusions: No  Memory:Normal: No  Memory: Poor recent  Insight and Judgment: No  Insight and Judgment: Poor judgment, Poor insight      Suicide Risk CSSR-S:  1) Within the past month, have you wished you were dead or wished you could go to sleep and not wake up? : Yes  2) Have you actually had any thoughts of killing yourself? : Yes  3) Have you been thinking about how you might kill yourself? : Yes  5) Have you started to work out or worked out the details of how to kill yourself?  Do you intend to carry out this plan? : No  6) Have you ever done anything, started to do anything, or prepared to do anything to end your life?: No      PLAN/TREATMENT RECOMMENDATIONS UPDATE: Suicide Risk CSSR-S:  1) Within the past month, have you wished you were dead or wished you could go to sleep and not wake up? : Yes  2) Have you actually had any thoughts of killing yourself? : Yes  3) Have you been thinking about how you might kill yourself? : Yes  5) Have you started to work out or worked out the details of how to kill yourself? Do you intend to carry out this plan? : No  6) Have you ever done anything, started to do anything, or prepared to do anything to end your life?: No      PLAN/TREATMENT RECOMMENDATIONS UPDATE: Patient will take medication as prescribed, eat 75% of meals, attend groups, participate in milieu activities, participate in treatment team and care planning for discharge and follow up.                        Darci Costa RN

## 2023-01-31 NOTE — PROGRESS NOTES
Group Therapy Note    Date: 1/30/2023  Start Time: 20:00  End Time:  21:00  Number of Participants: 12    Type of Group: Wrap-Up    Wellness Binder Information  Module Name:  /  Session Number:  /    Patient's Goal:  no goal     Notes:  new pt    Status After Intervention:  Unchanged    Participation Level:  Active Listener and Interactive    Participation Quality: Appropriate, Attentive, and Sharing      Speech:  pressured      Thought Process/Content: Logical      Affective Functioning: Blunted      Mood: anxious and depressed      Level of consciousness:  Alert and Attentive      Response to Learning: Able to change behavior      Endings: None Reported    Modes of Intervention: Socialization and Problem-solving      Discipline Responsible: [address], [email or web address]      Signature:  Mark Samuel

## 2023-01-31 NOTE — FLOWSHEET NOTE
Purposeful Rounding    Patient Location: Patient room    Patient willing to engage in conversation: Yes    Presentation/behavior: Cooperative and Pleasant    Affect: Brightens with interaction    Concerns reported: none at this  time    PRN medications given: n/a    Environmental assessment: Room free from clutter, Clear path to bathroom , and Adequate lighting    Fall prevention interventions in place: Yellow non-skid socks on    Daily Henry Fall Risk Score: 0-low      Electronically signed by Tal Hamlin RN on 1/31/23 at 8:16 AM EST

## 2023-01-31 NOTE — FLOWSHEET NOTE
Purposeful Rounding    Patient Location: Patient room    Patient willing to engage in conversation: No    Presentation/behavior: Other resting*    Affect: Neutral/Euthymic(normal)    Concerns reported: pt noted to be resting, eyes closed, respirations even and easy    PRN medications given: n/a    Environmental assessment: Room free from clutter, Clear path to bathroom , and Adequate lighting    Fall prevention interventions in place: Yellow non-skid socks on      Daily Henry Fall Risk Score: 0-low      Electronically signed by Reggie Rodgers RN on 1/31/23 at 2:59 PM EST

## 2023-01-31 NOTE — PROGRESS NOTES
Reassessment of PRN Tylenol, Patient rates her pain a 3 on a 1-10 scale at this time. Medication effective. Reassessment of PRN Hydroxyzine. Patient stated \"that medication worked well, I finally felt calm for the first time in a long while. \"  Medication effective.

## 2023-01-31 NOTE — PROGRESS NOTES
Patient complaining of increased anxiety and a headache. Rates her headache a 5 on a 1-10  scale at this  time. Ibuprofen given per pain and Hydroxyzine given for anxiety.   See [facility]

## 2023-01-31 NOTE — H&P
Hospital Medicine History & Physical      PCP: Katrine Kawasaki, DO    Date of Admission: 1/30/2023    Date of Service: Pt seen/examined on 01/31/23      Chief Complaint:    Chief Complaint   Patient presents with    Psychiatric Evaluation         History Of Present Illness: The patient is a 49 y.o. female with anxiety, depression, and chronic constipation who presented to [facility] ED for psychiatric evaluation and SI. Patient was seen and evaluated in the ED by the ED medical provider, patient was medically cleared for admission to [facility] at [facility]. This note serves as an admission medical H&P. Tobacco use: denies  ETOH use: occasional  Illicit drug use: medical marijuana    Patient is seen in room. She reports current cough since last Wednseday. States it is non-productive, but keeps her up at night. Denies fever, chills, n/v/d, chest pain or SOB. Denies any medical complaints at this time. Past Medical History:        Diagnosis Date    Anxiety     Depression     Strep sore throat        Past Surgical History:        Procedure Laterality Date    BLADDER SUSPENSION      CHOLECYSTECTOMY, LAPAROSCOPIC  10/2022    COLONOSCOPY  06/01/2015    no polyps. normal.    COSMETIC SURGERY         Medications Prior to Admission:    Prior to Admission medications    Medication Sig Start Date End Date Taking?  Authorizing Provider   venlafaxine (EFFEXOR XR) 150 MG extended release capsule TAKE 1 CAPSULE DAILY 11/15/22   Kaity Plata DO   levonorgestrel (MIRENA) IUD 52 mg 1 each by Intrauterine route once    Historical Provider, MD       Allergies:  Penicillins    Social History:  The patient currently lives in [facility]     Family History:   Positive as follows:        Problem Relation Age of Onset    Depression Mother     Depression Sister     Cancer Maternal Aunt         lung       REVIEW OF SYSTEMS:     Constitutional: Negative for fever   HENT: Negative for sore throat   Eyes: Negative for redness   Respiratory: Negative  for dyspnea, +cough   Cardiovascular: Negative for chest pain   Gastrointestinal: Negative for vomiting, diarrhea   Genitourinary: Negative for hematuria   Musculoskeletal: Negative for arthralgias   Skin: Negative for rash   Neurological: Negative for syncope    Hematological: Negative for easy bruising/bleeding   Psychiatric/Behavorial: Per psychiatry team evaluation     PHYSICAL EXAM:    /62   Pulse 68   Temp 98.2 °F (36.8 °C) (Temporal)   Resp 16   Ht 5' 3\" (1.6 m)   Wt 165 lb (74.8 kg)   SpO2 98%   BMI 29.23 kg/m²   Gen: No distress. Alert. Eyes: PERRL. No sclera icterus. No conjunctival injection. Neck: No JVD. Trachea midline. Resp: No accessory muscle use. No crackles. No wheezes. No rhonchi. CV: Regular rate. Regular rhythm. No murmur. No rub. No edema. GI: Non-tender. Non-distended. Normal bowel sounds. Skin: Warm and dry. No nodule on exposed extremities. No rash on exposed extremities. M/S: No cyanosis. No joint deformity. No clubbing. Neuro: Awake. No focal neurologic deficit on exam. CN II through XII intact. Patient is able to ambulate without difficulty. Psych: Per psychiatry team evaluation     CBC:   Recent Labs     01/30/23  1122   WBC 4.2   HGB 14.0   HCT 41.6   MCV 85.4        BMP:   Recent Labs     01/30/23  1122      K 3.6      CO2 23   BUN 8   CREATININE 0.6     LIVER PROFILE:   Recent Labs     01/30/23  1122   AST 31   ALT 94*   BILITOT 0.4   ALKPHOS 128     PT/INR: No results for input(s): PROTIME, INR in the last 72 hours. APTT: No results for input(s): APTT in the last 72 hours.   UA:  Recent Labs     01/30/23  1122   PHUR 6.0          U/A:    Lab Results   Component Value Date/Time    NITRITE neg 06/21/2016 04:15 PM    COLORU DARK YELLOW 08/08/2022 01:16 AM    WBCUA 10-20 08/08/2022 01:16 AM    RBCUA 3-4 08/08/2022 01:16 AM    MUCUS 3+ 08/08/2022 01:16 AM    BACTERIA 3+ 08/08/2022 01:16 AM    CLARITYU Clear 08/08/2022 01:16 AM    SPECGRAV 1.015 08/08/2022 01:16 AM    LEUKOCYTESUR TRACE 08/08/2022 01:16 AM    BLOODU TRACE-INTACT 08/08/2022 01:16 AM    GLUCOSEU Negative 08/08/2022 01:16 AM       CULTURES  Covid/influenza not detected       EKG:    Normal sinus rhythmNormal ECGWhen compared with ECG of 08-AUG-2022 01:36,No significant change was foundConfirmed by Robert Moura MD, [address] ([id number]) on 1/31/2023 6:57:26 AM      RADIOLOGY  No orders to display          ASSESSMENT/PLAN:  #Anxiety  #Depression  -per psychiatry team    #Cough  -covid/flu negative  -VSS and lungs CTA  -mucinex and prn tessalon     #Elevated TSH  -TSH 4.89, T4 pending    #Chronic constipation   -OTC meds prn    #ALT elevation  -no RUQ tenderness  -denies ETOH use   -no additional LFT abnormalities    #Biliary dyskinesia   -s/p cholecystectomy Oct 2022    #Cannabis use  -[facility]+    #[facility]+ PCP  -patient reports recent cold and cough medicine use  -denies recreational use     Jsesi Cristobal PA-C  1/31/2023   10:33 AM

## 2023-01-31 NOTE — PLAN OF CARE
Provider notified of patient's home medication verification. Verbal order given for Effexor XR 150mg HS and Trazodone 50 mg HS PRN. See orders.

## 2023-01-31 NOTE — PROGRESS NOTES
Behavioral Services  Medicare Certification Upon Admission    I certify that this patient's inpatient psychiatric hospital admission is medically necessary for:    [x] (1) Treatment which could reasonably be expected to improve this patient's condition,       [x] (2) Or for diagnostic study;     AND     [x](2) The inpatient psychiatric services are provided while the individual is under the care of a physician and are included in the individualized plan of care.     Estimated length of stay/service 5 d    Plan for post-hospital care outpt    Electronically signed by Rodrigo Erazo MD on 1/31/2023 at 12:20 PM

## 2023-01-31 NOTE — PLAN OF CARE
Problem: Anxiety  Goal: Will report anxiety at manageable levels  Description: INTERVENTIONS:  1. Administer medication as ordered  2. Teach and rehearse alternative coping skills  3. Provide emotional support with 1:1 interaction with staff  Outcome: Progressing     Problem: Depression/Self Harm  Goal: Effect of psychiatric condition will be minimized and patient will be protected from self harm  Description: INTERVENTIONS:  1. Assess impact of patient's symptoms on level of functioning, self care needs and offer support as indicated  2. Assess patient/family knowledge of depression, impact on illness and need for teaching  3. Provide emotional support, presence and reassurance  4. Assess for possible suicidal thoughts or ideation. If patient expresses suicidal thoughts or statements do not leave alone, initiate Suicide Precautions, move to a room close to the nursing station and obtain sitter  5. Initiate consults as appropriate with Mental Health Professional, Spiritual Care, Psychosocial CNS, and consider a recommendation to the LIP for a Psychiatric Consultation  Outcome: Progressing     Patient was visible on unit, social with peers. Medication complaint. Attended and participated in groups. Patient was cooperative with interview. Denies SI/HI/AVH. Patient did have some increased anxiety at start of shift and utilized PRN Hydroxyzine which was effective. Patient has been able to verbalize her needs to staff and has been cooperative with all care.
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
## 2023-02-01 LAB
ESTIMATED AVERAGE GLUCOSE: 105.4 MG/DL
HBA1C MFR BLD: 5.3 %

## 2023-02-01 PROCEDURE — 1240000000 HC EMOTIONAL WELLNESS R&B

## 2023-02-01 PROCEDURE — 6370000000 HC RX 637 (ALT 250 FOR IP): Performed by: PSYCHIATRY & NEUROLOGY

## 2023-02-01 PROCEDURE — 6370000000 HC RX 637 (ALT 250 FOR IP)

## 2023-02-01 RX ORDER — ARIPIPRAZOLE 10 MG/1
5 TABLET ORAL DAILY
Status: DISCONTINUED | OUTPATIENT
Start: 2023-02-01 | End: 2023-02-03

## 2023-02-01 RX ADMIN — VENLAFAXINE HYDROCHLORIDE 150 MG: 75 CAPSULE, EXTENDED RELEASE ORAL at 21:16

## 2023-02-01 RX ADMIN — IBUPROFEN 400 MG: 400 TABLET ORAL at 13:55

## 2023-02-01 RX ADMIN — TRAZODONE HYDROCHLORIDE 50 MG: 50 TABLET ORAL at 21:16

## 2023-02-01 RX ADMIN — HYDROXYZINE HYDROCHLORIDE 50 MG: 50 TABLET ORAL at 09:00

## 2023-02-01 RX ADMIN — HYDROXYZINE HYDROCHLORIDE 50 MG: 50 TABLET ORAL at 18:43

## 2023-02-01 RX ADMIN — ACETAMINOPHEN 650 MG: 325 TABLET ORAL at 10:49

## 2023-02-01 RX ADMIN — ARIPIPRAZOLE 5 MG: 10 TABLET ORAL at 10:49

## 2023-02-01 RX ADMIN — GUAIFENESIN 600 MG: 600 TABLET, EXTENDED RELEASE ORAL at 21:16

## 2023-02-01 RX ADMIN — GUAIFENESIN 600 MG: 600 TABLET, EXTENDED RELEASE ORAL at 09:00

## 2023-02-01 ASSESSMENT — PAIN SCALES - GENERAL
PAINLEVEL_OUTOF10: 7
PAINLEVEL_OUTOF10: 5
PAINLEVEL_OUTOF10: 5

## 2023-02-01 ASSESSMENT — PAIN DESCRIPTION - ORIENTATION
ORIENTATION: RIGHT;LEFT

## 2023-02-01 ASSESSMENT — PAIN DESCRIPTION - LOCATION
LOCATION: HEAD

## 2023-02-01 ASSESSMENT — PAIN - FUNCTIONAL ASSESSMENT
PAIN_FUNCTIONAL_ASSESSMENT: PREVENTS OR INTERFERES SOME ACTIVE ACTIVITIES AND ADLS
PAIN_FUNCTIONAL_ASSESSMENT: ACTIVITIES ARE NOT PREVENTED
PAIN_FUNCTIONAL_ASSESSMENT: ACTIVITIES ARE NOT PREVENTED

## 2023-02-01 ASSESSMENT — PAIN DESCRIPTION - PAIN TYPE: TYPE: ACUTE PAIN

## 2023-02-01 ASSESSMENT — PAIN DESCRIPTION - DESCRIPTORS
DESCRIPTORS: ACHING

## 2023-02-01 NOTE — PROGRESS NOTES
Patient complaining of increased anxiety. Patient noted to be sitting in the day room, increased motor activity as evidenced by leg shaking. Patient stated she wakes up each morning feeling anxious about how the day is going to go. Patient offered Hydroxyzine per order and she was agreeable.   See [facility]

## 2023-02-01 NOTE — PROGRESS NOTES
Patient continues to complain of headache. Patient now rates her pain a 7 on a 1-10 scale. Ibuprofen given per order and lights dimmed for comfort.   See [facility]

## 2023-02-01 NOTE — PROGRESS NOTES
Reassessment of PRN Ibuprofen. Patient noted to be resting, eyes closed, respirations even and easy. Medication effective.

## 2023-02-01 NOTE — PROGRESS NOTES
Reassessment of PRN Hydroxyzine. Patient noted to be sitting calmly in dayroom watching TV with peers. Medication effective.

## 2023-02-01 NOTE — FLOWSHEET NOTE
Purposeful Rounding    Patient Location: Patient room    Patient willing to engage in conversation: No    Presentation/behavior: Other resting*    Affect: Neutral/Euthymic(normal)    Concerns reported: pt noted to be resting eyes closed,  respirations even and easy    PRN medications given: n/a    Environmental assessment: Room free from clutter, Clear path to bathroom , and Adequate lighting    Fall prevention interventions in place: Yellow non-skid socks on    Daily Henry Fall Risk Score: 0-low      Electronically signed by Tal Hamlin RN on 2/1/23 at 8:50 AM EST

## 2023-02-01 NOTE — PROGRESS NOTES
Group Therapy Note    Date: 1/31/2023  Start Time: 20:00  End Time:  21:00  Number of Participants: 11    Type of Group: Recreational  wrap up    Wellness Binder Information  Module Name:  /  Session Number:  /    Patient's Goal:  out of room more     Notes:  goal completed per pt    Status After Intervention:  Unchanged    Participation Level:  Active Listener and Interactive    Participation Quality: Appropriate, Attentive, and Sharing      Speech:  pressured      Thought Process/Content: Logical      Affective Functioning: Blunted      Mood: anxious and depressed      Level of consciousness:  Alert and Attentive      Response to Learning: Progressing to goal      Endings: None Reported    Modes of Intervention: Socialization and Problem-solving      Discipline Responsible: [address] [employer]      Signature:  Tianna Aparicio

## 2023-02-01 NOTE — PROGRESS NOTES
Department of Psychiatry  Attending Progress Note  Chief Complaint: suicidal ideation    I talked with Ji Melgar in her room today. She was laying in bed and says that she has had a headache since she has been here. Her usual day to day routine starts with a cup of coffee (caffeine). She has not had any caffeine since she has been here because the coffee is not as good. She is going to go and get Advil from her nurse and try a Diet Coke or something with caffeine to see if that helps. Patient's chart was reviewed and collaborated with  about the treatment plan. SUBJECTIVE:    Patient is feeling unchanged. Suicidal ideation:  denies suicidal ideation. Patient does not have medication side effects. ROS: Patient has new complaints: Yes - headache \"since she has been here\"  Sleeping adequately: Yes   Appetite adequate: Yes  Attending groups: Yes  Visitors: No    OBJECTIVE    Physical  VITALS:  /67   Pulse 69   Temp 98.4 °F (36.9 °C) (Temporal)   Resp 14   Ht 5' 3\" (1.6 m)   Wt 165 lb (74.8 kg)   SpO2 96%   BMI 29.23 kg/m²     Mental Status Examination:  Patients appearance was street clothes and lying in bed. Thoughts are Enterprise and Logical. Homicidal ideations none. No abnormal movements, tics or mannerisms. Memory intact Aims 0. Concentration Good. Alert and oriented X 4. Insight and Judgement normal insight and judgment. Patient was cooperative. Patient gait normal. Mood depressed, affect depressed affect. Hallucinations Absent, suicidal ideations no specific plan to harm self.  Speech normal pitch and normal volume  Data  Labs:   Admission on 01/30/2023   Component Date Value Ref Range Status    WBC 01/30/2023 4.2  4.0 - 11.0 K/uL Final    RBC 01/30/2023 4.87  4.00 - 5.20 M/uL Final    Hemoglobin 01/30/2023 14.0  12.0 - 16.0 g/dL Final    Hematocrit 01/30/2023 41.6  36.0 - 48.0 % Final    MCV 01/30/2023 85.4  80.0 - 100.0 fL Final    MCH 01/30/2023 28.8  26.0 - 34.0 pg Final    MCHC 01/30/2023 33.7  31.0 - 36.0 g/dL Final    RDW 01/30/2023 14.2  12.4 - 15.4 % Final    Platelets 95/52/6858 155  135 - 450 K/uL Final    MPV 01/30/2023 9.2  5.0 - 10.5 fL Final    Neutrophils % 01/30/2023 63.4  % Final    Lymphocytes % 01/30/2023 25.0  % Final    Monocytes % 01/30/2023 9.7  % Final    Eosinophils % 01/30/2023 1.1  % Final    Basophils % 01/30/2023 0.8  % Final    Neutrophils Absolute 01/30/2023 2.7  1.7 - 7.7 K/uL Final    Lymphocytes Absolute 01/30/2023 1.0  1.0 - 5.1 K/uL Final    Monocytes Absolute 01/30/2023 0.4  0.0 - 1.3 K/uL Final    Eosinophils Absolute 01/30/2023 0.0  0.0 - 0.6 K/uL Final    Basophils Absolute 01/30/2023 0.0  0.0 - 0.2 K/uL Final    Sodium 01/30/2023 139  136 - 145 mmol/L Final    Potassium reflex Magnesium 01/30/2023 3.6  3.5 - 5.1 mmol/L Final    Chloride 01/30/2023 105  99 - 110 mmol/L Final    CO2 01/30/2023 23  21 - 32 mmol/L Final    Anion Gap 01/30/2023 11  3 - 16 Final    Glucose 01/30/2023 99  70 - 99 mg/dL Final    BUN 01/30/2023 8  7 - 20 mg/dL Final    Creatinine 01/30/2023 0.6  0.6 - 1.1 mg/dL Final    Est, Glom Filt Rate 01/30/2023 >60  >60 Final    Comment: Pediatric calculator link  Keisha.at. org/professionals/kdoqi/gfr_calculatorped  Effective Oct 3, 2022  These results are not intended for use in patients  <25years of age. eGFR results are calculated without  a race factor using the 2021 CKD-EPI equation. Careful  clinical correlation is recommended, particularly when  comparing to results calculated using previous equations. The CKD-EPI equation is less accurate in patients with  extremes of muscle mass, extra-renal metabolism of  creatinine, excessive creatinine ingestion, or following  therapy that affects renal tubular secretion.       Calcium 01/30/2023 8.6  8.3 - 10.6 mg/dL Final    Total Protein 01/30/2023 7.0  6.4 - 8.2 g/dL Final    Albumin 01/30/2023 4.7  3.4 - 5.0 g/dL Final    Albumin/Globulin Ratio 01/30/2023 2.0  1.1 - 2.2 Final    Total Bilirubin 01/30/2023 0.4  0.0 - 1.0 mg/dL Final    Alkaline Phosphatase 01/30/2023 128  40 - 129 U/L Final    ALT 01/30/2023 94 (A)  10 - 40 U/L Final    AST 01/30/2023 31  15 - 37 U/L Final    Ethanol Lvl 01/30/2023 None Detected  mg/dL Final    Comment:    None Detected  Conversion factor:  100 mg/dl = .100 g/dl  For Medical Purposes Only      Acetaminophen Level 01/30/2023 <5 (A)  10 - 30 ug/mL Final    Comment: Therapeutic Range: 10.0-30.0 ug/mL  Toxic: >=205 ug/mL      Salicylate, Serum 71/28/6944 <0.3 (A)  15.0 - 30.0 mg/dL Final    Comment: Therapeutic Range: 15.0-30.0 mg/dL  Toxic: >30.0 mg/dL      Amphetamine Screen, Urine 01/30/2023 Neg  Negative <1000ng/mL Final    Barbiturate Screen, Ur 01/30/2023 Neg  Negative <200 ng/mL Final    Benzodiazepine Screen, Urine 01/30/2023 Neg  Negative <200 ng/mL Final    Cannabinoid Scrn, Ur 01/30/2023 POSITIVE (A)  Negative <50 ng/mL Final    Cocaine Metabolite Screen, Urine 01/30/2023 Neg  Negative <300 ng/mL Final    Opiate Scrn, Ur 01/30/2023 Neg  Negative <300 ng/mL Final    Comment: \"Therapeutic levels of pain medication, especially oxycontin and synthetic  opioids, may not be detected by this Methodology. Pain management screen  panel  Drug panel-PM-Hi Res Ur, Interp (PAIN) should be considered for drug  monitoring \". PCP Screen, Urine 01/30/2023 POSITIVE (A)  Negative <25 ng/mL Final    Comment: High concentrations of dextromethorpan may cause false  positive results for PCP. Therefore, confirmatory testing  for PCP should be considered if clinically indicated. Methadone Screen, Urine 01/30/2023 Neg  Negative <300 ng/mL Final    Oxycodone Urine 01/30/2023 Neg  Negative <100 ng/ml Final    FENTANYL SCREEN, URINE 01/30/2023 Neg  Negative <5 ng/mL Final    pH, UA 01/30/2023 6.0   Final    Comment: Urine pH less than 5.0 or greater than 8.0 may indicate sample adulteration. Another sample should be collected if clinically  indicated. Drug Screen Comment: 01/30/2023 see below   Final    Comment: This method is a screening test to detect only these drug  classes as part of a medical workup. Confirmatory testing  by another method should be ordered if clinically indicated. hCG Qual 01/30/2023 Negative  Detects HCG level >10 MIU/mL Final    SARS-CoV-2 RNA, RT PCR 01/30/2023 NOT DETECTED  NOT DETECTED Final    Comment: Not Detected results do not preclude SARS-CoV-2 infection and  should not be used as the sole basis for patient management  decisions. Results must be combined with clinical observations,  patient history, and epidemiological information. Testing was performed using MARISSA SHIRLENE SARS-CoV-2 and Influenza A/B  nucleic acid assay. This test is a multiplex Real-Time Reverse  Transcriptase Polymerase Chain Reaction (RT-PCR)-based in vitro  diagnostic test intended for the qualitative detection of nucleic  acids from SARS-CoV-2, influenza A, and influenza B in nasopharyngeal  and nasal swab specimens for use under the FDAs Emergency Use  Authorization (EUA) only. Patient Fact Sheet:  FindDrives.pl  Provider Fact Sheet: FindDrives.pl  EUA: FindDrives.pl  IFU: FindDrives.pl    Methodology:  RT-PCR      INFLUENZA A 01/30/2023 NOT DETECTED  NOT DETECTED Final    INFLUENZA B 01/30/2023 NOT DETECTED  NOT DETECTED Final    Cholesterol, Total 01/30/2023 168  0 - 199 mg/dL Final    Triglycerides 01/30/2023 155 (A)  0 - 150 mg/dL Final    HDL 01/30/2023 36 (A)  40 - 60 mg/dL Final    Comment: An HDL cholesterol less than 40 mg/dL is low and  constitutes a coronary heart disease risk factor. An HDL cholesterol greater than 60 mg/dL is a  negative risk factor for coronary heart disease.       LDL Calculated 01/30/2023 101 (A)  <100 mg/dL Final    VLDL Cholesterol Calculated 01/30/2023 31  Not Established mg/dL Final    Hemoglobin A1C 01/30/2023 5.3  See comment % Final    Comment: Comment:  Diagnosis of Diabetes: > or = 6.5%  Increased risk of diabetes (Prediabetes): 5.7-6.4%  Glycemic Control: Nonpregnant Adults: <7.0%                    Pregnant: <6.0%        eAG 01/30/2023 105.4  mg/dL Final    TSH 01/30/2023 4.89 (A)  0.27 - 4.20 uIU/mL Final    Ventricular Rate 01/30/2023 63  BPM Final    Atrial Rate 01/30/2023 63  BPM Final    P-R Interval 01/30/2023 146  ms Final    QRS Duration 01/30/2023 90  ms Final    Q-T Interval 01/30/2023 412  ms Final    QTc Calculation (Bazett) 01/30/2023 421  ms Final    P Axis 01/30/2023 46  degrees Final    R Axis 01/30/2023 65  degrees Final    T Axis 01/30/2023 50  degrees Final    Diagnosis 01/30/2023 Normal sinus rhythmNormal ECGWhen compared with ECG of 08-AUG-2022 01:36,No significant change was foundConfirmed by Jani Sin MD, [address] (1987) on 1/31/2023 6:57:26 AM   Final    T4 Free 01/30/2023 1.0  0.9 - 1.8 ng/dL Final            Medications  Current Facility-Administered Medications: ARIPiprazole (ABILIFY) tablet 5 mg, 5 mg, Oral, Daily  guaiFENesin (MUCINEX) extended release tablet 600 mg, 600 mg, Oral, BID  benzonatate (TESSALON) capsule 100 mg, 100 mg, Oral, TID PRN  venlafaxine (EFFEXOR XR) extended release capsule 150 mg, 150 mg, Oral, Nightly  traZODone (DESYREL) tablet 50 mg, 50 mg, Oral, Nightly PRN  ondansetron (ZOFRAN-ODT) disintegrating tablet 4 mg, 4 mg, Oral, Q8H PRN  acetaminophen (TYLENOL) tablet 650 mg, 650 mg, Oral, Q4H PRN  ibuprofen (ADVIL;MOTRIN) tablet 400 mg, 400 mg, Oral, Q6H PRN  magnesium hydroxide (MILK OF MAGNESIA) 400 MG/5ML suspension 30 mL, 30 mL, Oral, Daily PRN  nicotine (NICODERM CQ) 21 MG/24HR 1 patch, 1 patch, TransDERmal, Daily  nicotine polacrilex (COMMIT) lozenge 2 mg, 2 mg, Oral, Q1H PRN  aluminum & magnesium hydroxide-simethicone (MAALOX) 200-200-20 MG/5ML suspension 30 mL, 30 mL, Oral, Q6H PRN  hydrOXYzine HCl (ATARAX) tablet 50 mg, 50 mg, Oral, TID PRN  OLANZapine (ZYPREXA) tablet 10 mg, 10 mg, Oral, Q4H PRN **OR** OLANZapine (ZYPREXA) 10 mg in sterile water 2 mL injection, 10 mg, IntraMUSCular, Q4H PRN  benztropine mesylate (COGENTIN) injection 2 mg, 2 mg, IntraMUSCular, BID PRN    ASSESSMENT AND PLAN    Principal Problem:    Severe episode of recurrent major depressive disorder, without psychotic features ([facility] [address]  Active Problems:    Suicidal ideation    ANAHI (generalized anxiety disorder)    Encounter for general medical examination    Cannabis use, uncomplicated    Elevated ALT measurement    Elevated TSH    Acute cough  Resolved Problems:    Depression, unspecified depression type       Patient s symptoms   show no change  Probable discharge is end of the week  Discharge planning is incomplete  Suicidal ideation is better  Total time with patient was 50 minutes and more than 50 % of that time was spent counseling the patient on their symptoms, treatment and expected goals. Addendum to RAINE sanchez note:  Pt seen, examined, and evaluated with RAINE sanchez, Arnav Corley, PA-S2, who acted as my scribe for the above documentation. I have reviewed the current history, physical findings, labs, assessment and plan; and agree with note as documented.      Alex Bhagat MD  Physician Psychiatry

## 2023-02-01 NOTE — PLAN OF CARE
Problem: Anxiety  Goal: Will report anxiety at manageable levels  Description: INTERVENTIONS:  1. Administer medication as ordered  2. Teach and rehearse alternative coping skills  3. Provide emotional support with 1:1 interaction with staff  1/31/2023 2227 by Lio Maloney RN  Outcome: Progressing     Problem: Depression/Self Harm  Goal: Effect of psychiatric condition will be minimized and patient will be protected from self harm  Description: INTERVENTIONS:  1. Assess impact of patient's symptoms on level of functioning, self care needs and offer support as indicated  2. Assess patient/family knowledge of depression, impact on illness and need for teaching  3. Provide emotional support, presence and reassurance  4. Assess for possible suicidal thoughts or ideation. If patient expresses suicidal thoughts or statements do not leave alone, initiate Suicide Precautions, move to a room close to the nursing station and obtain sitter  5. Initiate consults as appropriate with Mental Health Professional, Spiritual Care, Psychosocial CNS, and consider a recommendation to the LIP for a Psychiatric Consultation  1/31/2023 2227 by Lio Maloney RN  Outcome: Progressing     Problem: Pain  Goal: Verbalizes/displays adequate comfort level or baseline comfort level  Outcome: Progressing   Patient is alert and oriented x 4. She is anticipating being discharged from the hospital soon. She denied SI/HI,A/V hallucinations. She is noted to be out in day room and social with peers. She is able to verbalize her needs. She said she is feeling better from that when she was admitted. She has attended groups, took medications, and ate hs snack following group. Patient agrees to come to the staff for thoughts of self harm. Safety checks continue Q 15 minutes throughout the shift.

## 2023-02-01 NOTE — PLAN OF CARE
Problem: Anxiety  Goal: Will report anxiety at manageable levels  Description: INTERVENTIONS:  1. Administer medication as ordered  2. Teach and rehearse alternative coping skills  3. Provide emotional support with 1:1 interaction with staff  Outcome: Progressing     Problem: Depression/Self Harm  Goal: Effect of psychiatric condition will be minimized and patient will be protected from self harm  Description: INTERVENTIONS:  1. Assess impact of patient's symptoms on level of functioning, self care needs and offer support as indicated  2. Assess patient/family knowledge of depression, impact on illness and need for teaching  3. Provide emotional support, presence and reassurance  4. Assess for possible suicidal thoughts or ideation. If patient expresses suicidal thoughts or statements do not leave alone, initiate Suicide Precautions, move to a room close to the nursing station and obtain sitter  5. Initiate consults as appropriate with Mental Health Professional, Spiritual Care, Psychosocial CNS, and consider a recommendation to the LIP for a Psychiatric Consultation  Outcome: Progressing     Problem: Pain  Goal: Verbalizes/displays adequate comfort level or baseline comfort level  Outcome: Progressing    Patient was visible on unit, social with select peers. Medication complaint. Attended and participated in groups. Patient was cooperative with interview. Denies SI/HI/AVH. Continues with increased anxiety especially upon waking in the morning. Patient states she wakes up in a panic each day. She does feel like she is resting better. Complains of headache which required PRN medication. Tylenol was not effective, Ibuprofen worked better. She discussed having anxiety when thinking about going back to her job. She has been able to verbalize her needs to staff and has been cooperative with all care.

## 2023-02-02 PROCEDURE — 6370000000 HC RX 637 (ALT 250 FOR IP)

## 2023-02-02 PROCEDURE — 1240000000 HC EMOTIONAL WELLNESS R&B

## 2023-02-02 PROCEDURE — 6370000000 HC RX 637 (ALT 250 FOR IP): Performed by: PSYCHIATRY & NEUROLOGY

## 2023-02-02 RX ADMIN — ARIPIPRAZOLE 5 MG: 10 TABLET ORAL at 08:37

## 2023-02-02 RX ADMIN — VENLAFAXINE HYDROCHLORIDE 150 MG: 75 CAPSULE, EXTENDED RELEASE ORAL at 20:57

## 2023-02-02 RX ADMIN — IBUPROFEN 400 MG: 400 TABLET ORAL at 14:15

## 2023-02-02 RX ADMIN — TRAZODONE HYDROCHLORIDE 50 MG: 50 TABLET ORAL at 20:57

## 2023-02-02 RX ADMIN — GUAIFENESIN 600 MG: 600 TABLET, EXTENDED RELEASE ORAL at 08:37

## 2023-02-02 RX ADMIN — HYDROXYZINE HYDROCHLORIDE 50 MG: 50 TABLET ORAL at 08:37

## 2023-02-02 RX ADMIN — HYDROXYZINE HYDROCHLORIDE 50 MG: 50 TABLET ORAL at 14:15

## 2023-02-02 ASSESSMENT — LIFESTYLE VARIABLES: HOW MANY STANDARD DRINKS CONTAINING ALCOHOL DO YOU HAVE ON A TYPICAL DAY: PATIENT DOES NOT DRINK

## 2023-02-02 ASSESSMENT — PAIN DESCRIPTION - LOCATION: LOCATION: HEAD

## 2023-02-02 ASSESSMENT — PAIN SCALES - GENERAL: PAINLEVEL_OUTOF10: 3

## 2023-02-02 NOTE — DISCHARGE INSTRUCTIONS
Advanced Directives:  Patient {does/does not:200015} have a surrogate decision maker appointed   Name (if yes): *** Phone Number: ***  Patient {does/does not:200015} have a psychiatric and/ or medical advanced directive or power of . Patient {WAS/WAS JQU:[id number]} offered psychiatric and/ or medical advanced directive or power of  information/completion but declined to complete   Why not? ***    Discharge Planning is Complete. Discharge Date: 2/4/23  Reason for Hospitalization: Depression  Discharge Diagnosis: Depression   Discharging to: HOME    Your instructions: Your physician here was Diony Chan MD. If you have any questions please call the unit at [phone] for the adult unit or [phone] for [facility]. Please note that we have a patient family advisory [place] that meets the second Wednesday of January and the second Wednesday of July at [address] [facility] in [place] at [facility]. Department leadership would love for you to attend to give feedback on what we are doing well and areas in which we can improve our patient care. Please come if you are able and feel free to reach out to [facility] at [phone] with any questions. The crisis number for [facility] is [phone] ([facility]). This crisis line is available 24 hours a day, seven days a week. Please follow up with your PCP regarding your abnormal thyroid studies. Your TSH was elevated, and T4 value was within normal limits, which may be indicative of subclinical hypothyroidism. Follow-up thyroid function testing is recommended in 4-6 weeks for further evaluation. DUE TO THE WEEKEND WE ARE UNABLE TO SET UP A FOLLOW UP APPOINTMENT.   PLEASE CALL WITHIN 48 HOURS OF DISCHARGE TO SET UP THIS APPOINTMENT   Your next appointment is:  Name of Provider: Josee Carmona   Agency name: [facility]  Address: [address] [place], [place] [address]Nicole Crow [address] TAMARA Lopez [address]  Phone Number: [phone]  Special instructions (what to bring to appointment, etc.): ID, Insurance Card and a copy of your Discharge Summary        HERE IS A LIST OF THERAPISTS. CALL TO SEE IF THEY ACCEPT YOUR INSURANCE AND HAS AN OPENING IN [facility]    Outpatient Mental Health Treatment Services    Mental Health Therapy and Psychiatry (Medication Management)  [facility]  Location: [address]  Phone: [phone]    [facility]  Location: [facility] in [facility] [address], [address]. Phone: [phone]    [address] [place] [address]  Location: Multiple offices in the [facility]  Phone: [phone] or [address] [place] [address]  Locations: Multiple locations in [place] and [place]  Phone: [phone]    [facility]  Location: [address] [facility]  Phone: [address]  Location: Multiple offices in [place]   Phone: Alf Rivera ([phone])    Andreea Lockhart  Location: [facility] [address]  Phone: [phone] ([phone])    [address] [facility])  Location: [address] [facility], [address]  Phone: [phone]    [facility]  Location: offices in [address] [place] [address]  Phone: [address]  Location: [place], [place]  Phone: [phone]    Dr. Lisa Crockett   Location: [address] [place] [address]    Phone: [facility]  Location: [address]    Phone: [phone]    Mental Health Therapy Only, No Psychiatry (Medication Management)    [facility]  Location: [address]  Phone: [phone]    [facility]  Location: [address] [address]  Phone: [phone]     Discharge Completed By: Galina SEGURA, [facility]   Fax to: Follow up provider name and number  [phone]     The following personal items were collected during your admission and were returned to you:    Belongings  Dental Appliances: None  Vision - Corrective Lenses: Eyeglasses  Hearing Aid: None  Clothing: Jacket/Coat, Pants, Undergarments, Shirt, Socks, Footwear  Jewelry: None  Body Piercings Removed: N/A  Electronic Devices: Cell Phone  Weapons (Notify Protective Services/Security): None  Other Valuables: Money, Pietro Dai (22.00 leger)  Home Medications: None  Valuables Given To: Patient, Audrey John, Other (Comment) (cell phone, wallet, keys cash in safe)  Provide Name(s) of Who Valuable(s) Were Given To: n/a  Responsible person(s) in the waiting room: n/a  Patient approves for provider to speak to responsible person post operatively: Yes    By signing below, I understand and acknowledge receipt of the instructions indicated above.

## 2023-02-02 NOTE — PROGRESS NOTES
Department of Psychiatry  Attending Progress Note    Chief Complaint: depression    Pt very tearful during interview. Says \"I am just so disappointed in myself that I haven't gotten any better\". Pt reports drenching night sweats every night for at least a few months. No hot/cold intolerance. No reported fevers. Unsure on weight loss. Will notify med team.    Patient's chart was reviewed and collaborated with  about the treatment plan. SUBJECTIVE:        Patient is feeling worse. Suicidal ideation:  denies suicidal ideation. Patient does not have medication side effects. ROS: Patient has new complaints: no  Sleeping adequately:  Yes   Appetite adequate: Yes  Attending groups: Yes  Visitors:No    OBJECTIVE    Physical  VITALS:  /73   Pulse 68   Temp 97.5 °F (36.4 °C) (Oral)   Resp 16   Ht 5' 3\" (1.6 m)   Wt 165 lb (74.8 kg)   SpO2 100%   BMI 29.23 kg/m²     Mental Status Examination:  Patients appearance was distraught, tearful. Thoughts are  tearful . Homicidal ideations none. No abnormal movements, tics or mannerisms. Memory intact Aims 0. Concentration Good. Alert and oriented X 4. Insight and Judgement sad. Patient was cooperative and tearful. Patient gait normal. Mood sad, affect depressed affect Hallucinations Absent, suicidal ideations no specific plan to harm self Speech soft and tearful.   Data  Labs:   Admission on 01/30/2023   Component Date Value Ref Range Status    WBC 01/30/2023 4.2  4.0 - 11.0 K/uL Final    RBC 01/30/2023 4.87  4.00 - 5.20 M/uL Final    Hemoglobin 01/30/2023 14.0  12.0 - 16.0 g/dL Final    Hematocrit 01/30/2023 41.6  36.0 - 48.0 % Final    MCV 01/30/2023 85.4  80.0 - 100.0 fL Final    MCH 01/30/2023 28.8  26.0 - 34.0 pg Final    MCHC 01/30/2023 33.7  31.0 - 36.0 g/dL Final    RDW 01/30/2023 14.2  12.4 - 15.4 % Final    Platelets 99/06/5490 155  135 - 450 K/uL Final    MPV 01/30/2023 9.2  5.0 - 10.5 fL Final    Neutrophils % 01/30/2023 63.4  % Final    Lymphocytes % 01/30/2023 25.0  % Final    Monocytes % 01/30/2023 9.7  % Final    Eosinophils % 01/30/2023 1.1  % Final    Basophils % 01/30/2023 0.8  % Final    Neutrophils Absolute 01/30/2023 2.7  1.7 - 7.7 K/uL Final    Lymphocytes Absolute 01/30/2023 1.0  1.0 - 5.1 K/uL Final    Monocytes Absolute 01/30/2023 0.4  0.0 - 1.3 K/uL Final    Eosinophils Absolute 01/30/2023 0.0  0.0 - 0.6 K/uL Final    Basophils Absolute 01/30/2023 0.0  0.0 - 0.2 K/uL Final    Sodium 01/30/2023 139  136 - 145 mmol/L Final    Potassium reflex Magnesium 01/30/2023 3.6  3.5 - 5.1 mmol/L Final    Chloride 01/30/2023 105  99 - 110 mmol/L Final    CO2 01/30/2023 23  21 - 32 mmol/L Final    Anion Gap 01/30/2023 11  3 - 16 Final    Glucose 01/30/2023 99  70 - 99 mg/dL Final    BUN 01/30/2023 8  7 - 20 mg/dL Final    Creatinine 01/30/2023 0.6  0.6 - 1.1 mg/dL Final    Est, Glom Filt Rate 01/30/2023 >60  >60 Final    Comment: Pediatric calculator link  [email or web address]. org/professionals/kdoqi/gfr_calculatorped  Effective Oct 3, 2022  These results are not intended for use in patients  <25years of age. eGFR results are calculated without  a race factor using the 2021 CKD-EPI equation. Careful  clinical correlation is recommended, particularly when  comparing to results calculated using previous equations. The CKD-EPI equation is less accurate in patients with  extremes of muscle mass, extra-renal metabolism of  creatinine, excessive creatinine ingestion, or following  therapy that affects renal tubular secretion.       Calcium 01/30/2023 8.6  8.3 - 10.6 mg/dL Final    Total Protein 01/30/2023 7.0  6.4 - 8.2 g/dL Final    Albumin 01/30/2023 4.7  3.4 - 5.0 g/dL Final    Albumin/Globulin Ratio 01/30/2023 2.0  1.1 - 2.2 Final    Total Bilirubin 01/30/2023 0.4  0.0 - 1.0 mg/dL Final    Alkaline Phosphatase 01/30/2023 128  40 - 129 U/L Final    ALT 01/30/2023 94 (A)  10 - 40 U/L Final    AST 01/30/2023 31  15 - 37 U/L Final    Ethanol Lvl 01/30/2023 None Detected  mg/dL Final    Comment:    None Detected  Conversion factor:  100 mg/dl = .100 g/dl  For Medical Purposes Only      Acetaminophen Level 01/30/2023 <5 (A)  10 - 30 ug/mL Final    Comment: Therapeutic Range: 10.0-30.0 ug/mL  Toxic: >=132 ug/mL      Salicylate, Serum 35/73/0257 <0.3 (A)  15.0 - 30.0 mg/dL Final    Comment: Therapeutic Range: 15.0-30.0 mg/dL  Toxic: >30.0 mg/dL      Amphetamine Screen, Urine 01/30/2023 Neg  Negative <1000ng/mL Final    Barbiturate Screen, Ur 01/30/2023 Neg  Negative <200 ng/mL Final    Benzodiazepine Screen, Urine 01/30/2023 Neg  Negative <200 ng/mL Final    Cannabinoid Scrn, Ur 01/30/2023 POSITIVE (A)  Negative <50 ng/mL Final    Cocaine Metabolite Screen, Urine 01/30/2023 Neg  Negative <300 ng/mL Final    Opiate Scrn, Ur 01/30/2023 Neg  Negative <300 ng/mL Final    Comment: \"Therapeutic levels of pain medication, especially oxycontin and synthetic  opioids, may not be detected by this Methodology. Pain management screen  panel  Drug panel-PM-Hi Res Ur, Interp (PAIN) should be considered for drug  monitoring \". PCP Screen, Urine 01/30/2023 POSITIVE (A)  Negative <25 ng/mL Final    Comment: High concentrations of dextromethorpan may cause false  positive results for PCP. Therefore, confirmatory testing  for PCP should be considered if clinically indicated. Methadone Screen, Urine 01/30/2023 Neg  Negative <300 ng/mL Final    Oxycodone Urine 01/30/2023 Neg  Negative <100 ng/ml Final    FENTANYL SCREEN, URINE 01/30/2023 Neg  Negative <5 ng/mL Final    pH, UA 01/30/2023 6.0   Final    Comment: Urine pH less than 5.0 or greater than 8.0 may indicate sample adulteration. Another sample should be collected if clinically  indicated. Drug Screen Comment: 01/30/2023 see below   Final    Comment: This method is a screening test to detect only these drug  classes as part of a medical workup.   Confirmatory testing  by another method should be ordered if clinically indicated. hCG Qual 01/30/2023 Negative  Detects HCG level >10 MIU/mL Final    SARS-CoV-2 RNA, RT PCR 01/30/2023 NOT DETECTED  NOT DETECTED Final    Comment: Not Detected results do not preclude SARS-CoV-2 infection and  should not be used as the sole basis for patient management  decisions. Results must be combined with clinical observations,  patient history, and epidemiological information. Testing was performed using MARISSA SHIRLENE SARS-CoV-2 and Influenza A/B  nucleic acid assay. This test is a multiplex Real-Time Reverse  Transcriptase Polymerase Chain Reaction (RT-PCR)-based in vitro  diagnostic test intended for the qualitative detection of nucleic  acids from SARS-CoV-2, influenza A, and influenza B in nasopharyngeal  and nasal swab specimens for use under the FDAs Emergency Use  Authorization (EUA) only. Patient Fact Sheet:  FindDrives.pl  Provider Fact Sheet: FindDrives.pl  EUA: FindDrives.pl  IFU: FindDrives.pl    Methodology:  RT-PCR      INFLUENZA A 01/30/2023 NOT DETECTED  NOT DETECTED Final    INFLUENZA B 01/30/2023 NOT DETECTED  NOT DETECTED Final    Cholesterol, Total 01/30/2023 168  0 - 199 mg/dL Final    Triglycerides 01/30/2023 155 (A)  0 - 150 mg/dL Final    HDL 01/30/2023 36 (A)  40 - 60 mg/dL Final    Comment: An HDL cholesterol less than 40 mg/dL is low and  constitutes a coronary heart disease risk factor. An HDL cholesterol greater than 60 mg/dL is a  negative risk factor for coronary heart disease.       LDL Calculated 01/30/2023 101 (A)  <100 mg/dL Final    VLDL Cholesterol Calculated 01/30/2023 31  Not Established mg/dL Final    Hemoglobin A1C 01/30/2023 5.3  See comment % Final    Comment: Comment:  Diagnosis of Diabetes: > or = 6.5%  Increased risk of diabetes (Prediabetes): 5.7-6.4%  Glycemic Control: Nonpregnant Adults: <7.0%                    Pregnant: <6.0%        eAG 01/30/2023 105.4  mg/dL Final    TSH 01/30/2023 4.89 (A)  0.27 - 4.20 uIU/mL Final    Ventricular Rate 01/30/2023 63  BPM Final    Atrial Rate 01/30/2023 63  BPM Final    P-R Interval 01/30/2023 146  ms Final    QRS Duration 01/30/2023 90  ms Final    Q-T Interval 01/30/2023 412  ms Final    QTc Calculation (Bazett) 01/30/2023 421  ms Final    P Axis 01/30/2023 46  degrees Final    R Axis 01/30/2023 65  degrees Final    T Axis 01/30/2023 50  degrees Final    Diagnosis 01/30/2023 Normal sinus rhythmNormal ECGWhen compared with ECG of 08-AUG-2022 01:36,No significant change was foundConfirmed by Lebron Bailey MD, [address] ([id number]) on 1/31/2023 6:57:26 AM   Final    T4 Free 01/30/2023 1.0  0.9 - 1.8 ng/dL Final            Medications  Current Facility-Administered Medications: ARIPiprazole (ABILIFY) tablet 5 mg, 5 mg, Oral, Daily  guaiFENesin (MUCINEX) extended release tablet 600 mg, 600 mg, Oral, BID  benzonatate (TESSALON) capsule 100 mg, 100 mg, Oral, TID PRN  venlafaxine (EFFEXOR XR) extended release capsule 150 mg, 150 mg, Oral, Nightly  traZODone (DESYREL) tablet 50 mg, 50 mg, Oral, Nightly PRN  ondansetron (ZOFRAN-ODT) disintegrating tablet 4 mg, 4 mg, Oral, Q8H PRN  acetaminophen (TYLENOL) tablet 650 mg, 650 mg, Oral, Q4H PRN  ibuprofen (ADVIL;MOTRIN) tablet 400 mg, 400 mg, Oral, Q6H PRN  magnesium hydroxide (MILK OF MAGNESIA) 400 MG/5ML suspension 30 mL, 30 mL, Oral, Daily PRN  nicotine (NICODERM CQ) 21 MG/24HR 1 patch, 1 patch, TransDERmal, Daily  nicotine polacrilex (COMMIT) lozenge 2 mg, 2 mg, Oral, Q1H PRN  aluminum & magnesium hydroxide-simethicone (MAALOX) 200-200-20 MG/5ML suspension 30 mL, 30 mL, Oral, Q6H PRN  hydrOXYzine HCl (ATARAX) tablet 50 mg, 50 mg, Oral, TID PRN  OLANZapine (ZYPREXA) tablet 10 mg, 10 mg, Oral, Q4H PRN **OR** OLANZapine (ZYPREXA) 10 mg in sterile water 2 mL injection, 10 mg, IntraMUSCular, Q4H PRN  benztropine mesylate (COGENTIN) injection 2 mg, 2 mg, IntraMUSCular, BID PRN    ASSESSMENT AND PLAN    Principal Problem:    Severe episode of recurrent major depressive disorder, without psychotic features ([address]  Active Problems:    Suicidal ideation    ANAHI (generalized anxiety disorder)    Encounter for general medical examination    Cannabis use, uncomplicated    Elevated ALT measurement    Elevated TSH    Acute cough  Resolved Problems:    Depression, unspecified depression type       1. Patient s symptoms   are worsening  2. Probable discharge is tbd  3. Discharge planning is incomplete  4. Suicidal ideation is unchanged  5. Total time with patient was 50 minutes and more than 50 % of that time was spent counseling the patient on their symptoms, treatment and expected goals. Addendum to PA student note:  Pt seen, examined, and evaluated with PA student, Zafar Bloom, PA-S2, who acted as my scribe for the above documentation. I have reviewed the current history, physical findings, labs, assessment and plan; and agree with note as documented.      Queen Latonya MD  Physician Psychiatry

## 2023-02-02 NOTE — PROGRESS NOTES
Group Therapy Note    Date: 2/2/2023  Start Time: 1300  End Time:  1400  Number of Participants: 8    Type of Group: Music Group    Notes:  Pt present for most of Music Group. While present, Pt participated by singing along to music. Participation Level:  Active Listener and Interactive    Participation Quality: Appropriate and Attentive      Speech:  normal      Affective Functioning: Congruent      Endings: None Reported    Modes of Intervention: Support, Socialization, Activity, and Media      Discipline Responsible: Chaplain Paz Buckley       02/02/23 1423   Encounter Summary   Encounter Overview/Reason  Behavioral Health   Service Provided For: Patient   Last Encounter    (2/2 Music Group)   Complexity of Encounter Moderate   Begin Time 1300   End Time  1345   Total Time Calculated 45 min   Behavioral Health    Type  Spirituality Group

## 2023-02-02 NOTE — PLAN OF CARE
Problem: Depression/Self Harm  Goal: Effect of psychiatric condition will be minimized and patient will be protected from self harm  Description: INTERVENTIONS:  1. Assess impact of patient's symptoms on level of functioning, self care needs and offer support as indicated  2. Assess patient/family knowledge of depression, impact on illness and need for teaching  3. Provide emotional support, presence and reassurance  4. Assess for possible suicidal thoughts or ideation. If patient expresses suicidal thoughts or statements do not leave alone, initiate Suicide Precautions, move to a room close to the nursing station and obtain sitter  5. Initiate consults as appropriate with Mental Health Professional, Spiritual Care, Psychosocial CNS, and consider a recommendation to the [facility] for a Psychiatric Consultation  2/1/2023 2301 by Armin Murphy RN  Outcome: Progressing  2/1/2023 1636 by Derick Dubon RN  Outcome: Progressing     Problem: Anxiety  Goal: Will report anxiety at manageable levels  Description: INTERVENTIONS:  1. Administer medication as ordered  2. Teach and rehearse alternative coping skills  3. Provide emotional support with 1:1 interaction with staff  2/1/2023 2301 by Armin Murphy RN  Outcome: Progressing  2/1/2023 1636 by Derick Dubon RN  Outcome: Progressing       Patient has been visible on unit, interacting well with group of peers. Patient is cooperative and friendly. She reports mild anxiety, feeling that it is at its worse in the morning time. She feels the anxiety medication is helpful. No suicidal thoughts and CFS. She reports she would like to attend more groups and learn more coping skills. No AVH. PRN Trazodone was provided for sleep per patient request, per orders. No further concerns are identified.

## 2023-02-02 NOTE — PLAN OF CARE
Problem: Anxiety  Goal: Will report anxiety at manageable levels  Description: INTERVENTIONS:  1. Administer medication as ordered  2. Teach and rehearse alternative coping skills  3. Provide emotional support with 1:1 interaction with staff  Outcome: Progressing     Problem: Depression/Self Harm  Goal: Effect of psychiatric condition will be minimized and patient will be protected from self harm  Description: INTERVENTIONS:  1. Assess impact of patient's symptoms on level of functioning, self care needs and offer support as indicated  2. Assess patient/family knowledge of depression, impact on illness and need for teaching  3. Provide emotional support, presence and reassurance  4. Assess for possible suicidal thoughts or ideation. If patient expresses suicidal thoughts or statements do not leave alone, initiate Suicide Precautions, move to a room close to the nursing station and obtain sitter  5.  Initiate consults as appropriate with Mental Health Professional, Spiritual Care, Psychosocial CNS, and consider a recommendation to the [facility] for a Psychiatric Consultation  Outcome: Progressing     Problem: Pain  Goal: Verbalizes/displays adequate comfort level or baseline comfort level  Outcome: Progressing

## 2023-02-02 NOTE — BH NOTE
Pt approached writer with complaints of anxiety, reports this is due to being around a lot of people, states this is an ongoing issue. Requesting PRN Vistaril. Administered per order.

## 2023-02-02 NOTE — BH NOTE
Pt tearful at this time and reports anxiety 7/10. PRN Vistaril given per order. Pt also c/o headache, PRN ibuprofen given.

## 2023-02-03 VITALS
TEMPERATURE: 98.1 F | WEIGHT: 165 LBS | RESPIRATION RATE: 16 BRPM | DIASTOLIC BLOOD PRESSURE: 84 MMHG | HEIGHT: 63 IN | OXYGEN SATURATION: 98 % | SYSTOLIC BLOOD PRESSURE: 130 MMHG | BODY MASS INDEX: 29.23 KG/M2 | HEART RATE: 72 BPM

## 2023-02-03 PROCEDURE — 6370000000 HC RX 637 (ALT 250 FOR IP): Performed by: PSYCHIATRY & NEUROLOGY

## 2023-02-03 PROCEDURE — 1240000000 HC EMOTIONAL WELLNESS R&B

## 2023-02-03 PROCEDURE — 6370000000 HC RX 637 (ALT 250 FOR IP)

## 2023-02-03 RX ORDER — ARIPIPRAZOLE 10 MG/1
10 TABLET ORAL DAILY
Status: DISCONTINUED | OUTPATIENT
Start: 2023-02-04 | End: 2023-02-04 | Stop reason: HOSPADM

## 2023-02-03 RX ADMIN — GUAIFENESIN 600 MG: 600 TABLET, EXTENDED RELEASE ORAL at 21:15

## 2023-02-03 RX ADMIN — TRAZODONE HYDROCHLORIDE 50 MG: 50 TABLET ORAL at 21:15

## 2023-02-03 RX ADMIN — HYDROXYZINE HYDROCHLORIDE 50 MG: 50 TABLET ORAL at 16:37

## 2023-02-03 RX ADMIN — IBUPROFEN 400 MG: 400 TABLET ORAL at 13:37

## 2023-02-03 RX ADMIN — HYDROXYZINE HYDROCHLORIDE 50 MG: 50 TABLET ORAL at 08:12

## 2023-02-03 RX ADMIN — GUAIFENESIN 600 MG: 600 TABLET, EXTENDED RELEASE ORAL at 08:12

## 2023-02-03 RX ADMIN — VENLAFAXINE HYDROCHLORIDE 150 MG: 75 CAPSULE, EXTENDED RELEASE ORAL at 21:15

## 2023-02-03 RX ADMIN — ARIPIPRAZOLE 5 MG: 10 TABLET ORAL at 08:12

## 2023-02-03 ASSESSMENT — PAIN DESCRIPTION - LOCATION: LOCATION: HEAD

## 2023-02-03 ASSESSMENT — PAIN SCALES - GENERAL: PAINLEVEL_OUTOF10: 5

## 2023-02-03 NOTE — PLAN OF CARE
Problem: Anxiety  Goal: Will report anxiety at manageable levels  Description: INTERVENTIONS:  1. Administer medication as ordered  2. Teach and rehearse alternative coping skills  3. Provide emotional support with 1:1 interaction with staff  2/2/2023 2210 by Alyx Dyson RN  Outcome: Progressing  2/2/2023 1748 by Dian Duarte RN  Outcome: Progressing     Problem: Depression/Self Harm  Goal: Effect of psychiatric condition will be minimized and patient will be protected from self harm  Description: INTERVENTIONS:  1. Assess impact of patient's symptoms on level of functioning, self care needs and offer support as indicated  2. Assess patient/family knowledge of depression, impact on illness and need for teaching  3. Provide emotional support, presence and reassurance  4. Assess for possible suicidal thoughts or ideation. If patient expresses suicidal thoughts or statements do not leave alone, initiate Suicide Precautions, move to a room close to the nursing station and obtain sitter  5. Initiate consults as appropriate with Mental Health Professional, Spiritual Care, Psychosocial CNS, and consider a recommendation to the LIP for a Psychiatric Consultation  2/2/2023 2210 by Alyx Dyson RN  Outcome: Progressing  2/2/2023 1748 by Dian Duarte RN  Outcome: Progressing     Problem: Pain  Goal: Verbalizes/displays adequate comfort level or baseline comfort level  2/2/2023 2210 by Alyx Dyson RN  Outcome: Progressing  2/2/2023 1748 by Dian Duarte RN  Outcome: Progressing       Patient was visible in common area, using telephone upon initial observation this shift. She was cooperative with assessment. Patient slightly tearful, sad and anxious. She reports that she woke up mentally and physically exhausted, but that got better throughout the day. She reports she suffers greatly from social anxiety and tried to stay out of her room as the day went on. She feels her anxiety is worse in the  mornings. She attended wrap up group and found that to be helpful. She denies any si/hi/avh and cfs. She was compliant with HS medications and received PRN Trazodone per request and order. No further concerns identified.

## 2023-02-03 NOTE — GROUP NOTE
Group Therapy Note    Date: 2/3/2023    Group Start Time: 1000  Group End Time: 1584  Group Topic: Psychoeducation    [facility]    IMELDA Rodríguez        Group Therapy Note  Clinician introduced barriers in communication, typed of communication, taking accountability in what and how we communicate and accepting feedback. Attendees: 9       Patient's Goal:      Notes:  Patient was cooperative and fully engaged in the group discussion and activity. Patient discussed the non verbal, verbal, body language, tone of voice etc., and the barriers such as assumptions. Patient was able to discuss the importance of accepting feedback. Status After Intervention:  Improved    Participation Level:  Active Listener    Participation Quality: Appropriate, Attentive, Sharing, and Supportive      Speech:  normal      Thought Process/Content: Logical      Affective Functioning: Congruent      Mood: anxious and fearful      Level of consciousness:  Attentive      Response to Learning: Able to retain information      Endings: None Reported    Modes of Intervention: Education, Support, and Activity      Discipline Responsible: /Counselor      Signature:  IMELDA Rodríguez

## 2023-02-03 NOTE — GROUP NOTE
Group Therapy Note    Date: 2/2/2023    Group Start Time: 2030  Group End Time: 2050  Group Topic: 27 Marcel Benitez RN        Group Therapy Note    Attendees: Group Documentation    Patient attended  group therapy that reviewed the importance of setting daily goals. Nurse specifically asked attendees if they would like to share the goal they set for the day and if they had met that goal.  Nurse shared how it may help to set one or two easy goals per day and continue to add goals to each day as one feels better mentally. Patients were also given education on the importance of describing their specific symptoms to staff in order to help us correctly assess their mental health. Patients were encourage to let the staff know if they felt they were becoming suicidal or hearing voices. Also, the patients were educated that nursing would like to know what quality of sleep they are getting and any side effects they are having to their medications. Patients were encouraged to contemplate what led to their admission and to think about how much they are improving or are not improving with their symptoms and the importance of sharing their concerns with their nurse or HCP. Patients were very engaged in the discussion and attentive and voiced their appreciation of the topic. Status After Intervention:  Improved    Participation Level:  Active Listener    Participation Quality: Appropriate      Speech:  normal      Thought Process/Content: Logical      Affective Functioning: Congruent      Mood: euthymic      Level of consciousness:  Alert      Response to Learning: Able to verbalize/acknowledge new learning      Endings: None Reported    Modes of Intervention: Education      Discipline Responsible: Registered Nurse      Signature:  Chikis Carpenter RN

## 2023-02-03 NOTE — GROUP NOTE
Group Therapy Note    Date: 2/3/2023    Group Start Time: 1300  Group End Time: 1288  Group Topic: Psychoeducation    [facility]    IMELDA Pimentel        Group Therapy Note  Clinician introduced the group members to the fight/flight/fear response. Member shared their somatic symptoms and the clinician taught them grounding and mindfulness techniques to return to baseline. Attendees: 5       Patient's Goal:      Notes:  :  Patient was cooperative and fully engaged in the group discussion and skills practice. He participated in the mindfulness and grounding techniques. Patient discussed feeling calm and relaxed afterwards. Status After Intervention:  Improved    Participation Level:  Active Listener and Interactive    Participation Quality: Appropriate, Attentive, Sharing, and Supportive      Speech:  normal      Thought Process/Content: Logical      Affective Functioning: Congruent      Mood: euthymic      Level of consciousness:  Attentive      Response to Learning: Able to verbalize/acknowledge new learning      Endings: None Reported    Modes of Intervention: Education, Support, Socialization, and Exploration      Discipline Responsible: /Counselor      Signature:  IMELDA Pimentel

## 2023-02-03 NOTE — PROGRESS NOTES
Department of Psychiatry  Attending Progress Note  Chief Complaint: depression    Pt tearful during interview today. Was in the common area when I went to speak with her, improvement from yesterday. Pt says she feels like she is \"always on the edge of crying\" and this is distressing to her. She is unsure if the medicine is helping her at all or if she is just feeling worse. Encouraged pt to attend groups & participate. Her goal today is to [place] out of her bed as much as possible\". Frustrated with lack of coping skills and lack of immediate improvement. Reminded pt that we start the process of feeling better while inpatient, but much of the work occurs outpatient. Patient's chart was reviewed and collaborated with  about the treatment plan. SUBJECTIVE:    Patient is feeling unchanged. Suicidal ideation:  denies suicidal ideation. Patient does not have medication side effects. ROS: Patient has new complaints: no  Sleeping adequately:  No - did not sleep well last night   Appetite adequate: Yes  Attending groups: Yes  Visitors:No    OBJECTIVE    Physical  VITALS:  /80   Pulse 78   Temp 97.3 °F (36.3 °C) (Oral)   Resp 16   Ht 5' 3\" (1.6 m)   Wt 165 lb (74.8 kg)   SpO2 99%   BMI 29.23 kg/m²     Mental Status Examination:  Patients appearance was street clothes and sad. Thoughts are Goal directed. Homicidal ideations none. No abnormal movements, tics or mannerisms. Memory intact [place] 0. Concentration Good. Alert and oriented X 4. Insight and Judgement normal insight and judgment. Patient was cooperative. Patient gait normal. Mood sad, affect depressed affect.  Hallucinations Absent, suicidal ideations no specific plan to harm self Speech soft and tearful  Data  Labs:   Admission on 01/30/2023   Component Date Value Ref Range Status    WBC 01/30/2023 4.2  4.0 - 11.0 K/uL Final    RBC 01/30/2023 4.87  4.00 - 5.20 M/uL Final    Hemoglobin 01/30/2023 14.0  12.0 - 16.0 g/dL Final Hematocrit 01/30/2023 41.6  36.0 - 48.0 % Final    MCV 01/30/2023 85.4  80.0 - 100.0 fL Final    MCH 01/30/2023 28.8  26.0 - 34.0 pg Final    MCHC 01/30/2023 33.7  31.0 - 36.0 g/dL Final    RDW 01/30/2023 14.2  12.4 - 15.4 % Final    Platelets 01/59/3068 155  135 - 450 K/uL Final    MPV 01/30/2023 9.2  5.0 - 10.5 fL Final    Neutrophils % 01/30/2023 63.4  % Final    Lymphocytes % 01/30/2023 25.0  % Final    Monocytes % 01/30/2023 9.7  % Final    Eosinophils % 01/30/2023 1.1  % Final    Basophils % 01/30/2023 0.8  % Final    Neutrophils Absolute 01/30/2023 2.7  1.7 - 7.7 K/uL Final    Lymphocytes Absolute 01/30/2023 1.0  1.0 - 5.1 K/uL Final    Monocytes Absolute 01/30/2023 0.4  0.0 - 1.3 K/uL Final    Eosinophils Absolute 01/30/2023 0.0  0.0 - 0.6 K/uL Final    Basophils Absolute 01/30/2023 0.0  0.0 - 0.2 K/uL Final    Sodium 01/30/2023 139  136 - 145 mmol/L Final    Potassium reflex Magnesium 01/30/2023 3.6  3.5 - 5.1 mmol/L Final    Chloride 01/30/2023 105  99 - 110 mmol/L Final    CO2 01/30/2023 23  21 - 32 mmol/L Final    Anion Gap 01/30/2023 11  3 - 16 Final    Glucose 01/30/2023 99  70 - 99 mg/dL Final    BUN 01/30/2023 8  7 - 20 mg/dL Final    Creatinine 01/30/2023 0.6  0.6 - 1.1 mg/dL Final    Est, Glom Filt Rate 01/30/2023 >60  >60 Final    Comment: Pediatric calculator link  Axel.at. org/professionals/kdoqi/gfr_calculatorped  Effective Oct 3, 2022  These results are not intended for use in patients  <25years of age. eGFR results are calculated without  a race factor using the 2021 CKD-EPI equation. Careful  clinical correlation is recommended, particularly when  comparing to results calculated using previous equations. The CKD-EPI equation is less accurate in patients with  extremes of muscle mass, extra-renal metabolism of  creatinine, excessive creatinine ingestion, or following  therapy that affects renal tubular secretion.       Calcium 01/30/2023 8.6  8.3 - 10.6 mg/dL Final    Total Protein 01/30/2023 7.0  6.4 - 8.2 g/dL Final    Albumin 01/30/2023 4.7  3.4 - 5.0 g/dL Final    Albumin/Globulin Ratio 01/30/2023 2.0  1.1 - 2.2 Final    Total Bilirubin 01/30/2023 0.4  0.0 - 1.0 mg/dL Final    Alkaline Phosphatase 01/30/2023 128  40 - 129 U/L Final    ALT 01/30/2023 94 (A)  10 - 40 U/L Final    AST 01/30/2023 31  15 - 37 U/L Final    Ethanol Lvl 01/30/2023 None Detected  mg/dL Final    Comment:    None Detected  Conversion factor:  100 mg/dl = .100 g/dl  For Medical Purposes Only      Acetaminophen Level 01/30/2023 <5 (A)  10 - 30 ug/mL Final    Comment: Therapeutic Range: 10.0-30.0 ug/mL  Toxic: >=098 ug/mL      Salicylate, Serum 41/74/9942 <0.3 (A)  15.0 - 30.0 mg/dL Final    Comment: Therapeutic Range: 15.0-30.0 mg/dL  Toxic: >30.0 mg/dL      Amphetamine Screen, Urine 01/30/2023 Neg  Negative <1000ng/mL Final    Barbiturate Screen, Ur 01/30/2023 Neg  Negative <200 ng/mL Final    Benzodiazepine Screen, Urine 01/30/2023 Neg  Negative <200 ng/mL Final    Cannabinoid Scrn, Ur 01/30/2023 POSITIVE (A)  Negative <50 ng/mL Final    Cocaine Metabolite Screen, Urine 01/30/2023 Neg  Negative <300 ng/mL Final    Opiate Scrn, Ur 01/30/2023 Neg  Negative <300 ng/mL Final    Comment: \"Therapeutic levels of pain medication, especially oxycontin and synthetic  opioids, may not be detected by this Methodology. Pain management screen  panel  Drug panel-PM-Hi Res Ur, Interp (PAIN) should be considered for drug  monitoring \". PCP Screen, Urine 01/30/2023 POSITIVE (A)  Negative <25 ng/mL Final    Comment: High concentrations of dextromethorpan may cause false  positive results for PCP. Therefore, confirmatory testing  for PCP should be considered if clinically indicated.       Methadone Screen, Urine 01/30/2023 Neg  Negative <300 ng/mL Final    Oxycodone Urine 01/30/2023 Neg  Negative <100 ng/ml Final    FENTANYL SCREEN, URINE 01/30/2023 Neg  Negative <5 ng/mL Final    pH, UA 01/30/2023 6.0   Final    Comment: Urine pH less than 5.0 or greater than 8.0 may indicate sample adulteration. Another sample should be collected if clinically  indicated. Drug Screen Comment: 01/30/2023 see below   Final    Comment: This method is a screening test to detect only these drug  classes as part of a medical workup. Confirmatory testing  by another method should be ordered if clinically indicated. hCG Qual 01/30/2023 Negative  Detects HCG level >10 MIU/mL Final    SARS-CoV-2 RNA, RT PCR 01/30/2023 NOT DETECTED  NOT DETECTED Final    Comment: Not Detected results do not preclude SARS-CoV-2 infection and  should not be used as the sole basis for patient management  decisions. Results must be combined with clinical observations,  patient history, and epidemiological information. Testing was performed using MARISSA SHIRLENE SARS-CoV-2 and Influenza A/B  nucleic acid assay. This test is a multiplex Real-Time Reverse  Transcriptase Polymerase Chain Reaction (RT-PCR)-based in vitro  diagnostic test intended for the qualitative detection of nucleic  acids from SARS-CoV-2, influenza A, and influenza B in nasopharyngeal  and nasal swab specimens for use under the FDAs Emergency Use  Authorization (EUA) only. Patient Fact Sheet:  FindDrives.pl  Provider Fact Sheet: FindDrives.pl  EUA: FindDrives.pl  IFU: FindDrives.pl    Methodology:  RT-PCR      INFLUENZA A 01/30/2023 NOT DETECTED  NOT DETECTED Final    INFLUENZA B 01/30/2023 NOT DETECTED  NOT DETECTED Final    Cholesterol, Total 01/30/2023 168  0 - 199 mg/dL Final    Triglycerides 01/30/2023 155 (A)  0 - 150 mg/dL Final    HDL 01/30/2023 36 (A)  40 - 60 mg/dL Final    Comment: An HDL cholesterol less than 40 mg/dL is low and  constitutes a coronary heart disease risk factor. An HDL cholesterol greater than 60 mg/dL is a  negative risk factor for coronary heart disease. LDL Calculated 01/30/2023 101 (A)  <100 mg/dL Final    VLDL Cholesterol Calculated 01/30/2023 31  Not Established mg/dL Final    Hemoglobin A1C 01/30/2023 5.3  See comment % Final    Comment: Comment:  Diagnosis of Diabetes: > or = 6.5%  Increased risk of diabetes (Prediabetes): 5.7-6.4%  Glycemic Control: Nonpregnant Adults: <7.0%                    Pregnant: <6.0%        eAG 01/30/2023 105.4  mg/dL Final    TSH 01/30/2023 4.89 (A)  0.27 - 4.20 uIU/mL Final    Ventricular Rate 01/30/2023 63  BPM Final    Atrial Rate 01/30/2023 63  BPM Final    P-R Interval 01/30/2023 146  ms Final    QRS Duration 01/30/2023 90  ms Final    Q-T Interval 01/30/2023 412  ms Final    QTc Calculation (Bazett) 01/30/2023 421  ms Final    P Axis 01/30/2023 46  degrees Final    R Axis 01/30/2023 65  degrees Final    T Axis 01/30/2023 50  degrees Final    Diagnosis 01/30/2023 Normal sinus rhythmNormal ECGWhen compared with ECG of 08-AUG-2022 01:36,No significant change was foundConfirmed by Apryl Ricks MD, [address] ([id number]) on 1/31/2023 6:57:26 AM   Final    T4 Free 01/30/2023 1.0  0.9 - 1.8 ng/dL Final            Medications  Current Facility-Administered Medications: ARIPiprazole (ABILIFY) tablet 5 mg, 5 mg, Oral, Daily  guaiFENesin (MUCINEX) extended release tablet 600 mg, 600 mg, Oral, BID  benzonatate (TESSALON) capsule 100 mg, 100 mg, Oral, TID PRN  venlafaxine (EFFEXOR XR) extended release capsule 150 mg, 150 mg, Oral, Nightly  traZODone (DESYREL) tablet 50 mg, 50 mg, Oral, Nightly PRN  ondansetron (ZOFRAN-ODT) disintegrating tablet 4 mg, 4 mg, Oral, Q8H PRN  acetaminophen (TYLENOL) tablet 650 mg, 650 mg, Oral, Q4H PRN  ibuprofen (ADVIL;MOTRIN) tablet 400 mg, 400 mg, Oral, Q6H PRN  magnesium hydroxide (MILK OF MAGNESIA) 400 MG/5ML suspension 30 mL, 30 mL, Oral, Daily PRN  nicotine (NICODERM CQ) 21 MG/24HR 1 patch, 1 patch, TransDERmal, Daily  nicotine polacrilex (COMMIT) lozenge 2 mg, 2 mg, Oral, Q1H PRN  aluminum & magnesium hydroxide-simethicone (MAALOX) 701-678-80 MG/5ML suspension 30 mL, 30 mL, Oral, Q6H PRN  hydrOXYzine HCl (ATARAX) tablet 50 mg, 50 mg, Oral, TID PRN  OLANZapine (ZYPREXA) tablet 10 mg, 10 mg, Oral, Q4H PRN **OR** OLANZapine (ZYPREXA) 10 mg in sterile water 2 mL injection, 10 mg, IntraMUSCular, Q4H PRN  benztropine mesylate (COGENTIN) injection 2 mg, 2 mg, IntraMUSCular, BID PRN    ASSESSMENT AND PLAN    Principal Problem:    Severe episode of recurrent major depressive disorder, without psychotic features ([facility] [address]  Active Problems:    Suicidal ideation    ANAHI (generalized anxiety disorder)    Encounter for general medical examination    Cannabis use, uncomplicated    Elevated ALT measurement    Elevated TSH    Acute cough  Resolved Problems:    Depression, unspecified depression type       1. Patient s symptoms   show no change  2. Probable discharge is weekend  3. Discharge planning is incomplete  4. Suicidal ideation is better  5. Total time with patient was 50 minutes and more than 50 % of that time was spent counseling the patient on their symptoms, treatment and expected goals. Addendum to PA student note:  Pt seen, examined, and evaluated with PA student, Jordan Lewis, PA-S2, who acted as my scribe for the above documentation. I have reviewed the current history, physical findings, labs, assessment and plan; and agree with note as documented.      Sofia Waller MD  Physician Psychiatry

## 2023-02-04 PROCEDURE — 6370000000 HC RX 637 (ALT 250 FOR IP)

## 2023-02-04 PROCEDURE — 6370000000 HC RX 637 (ALT 250 FOR IP): Performed by: PSYCHIATRY & NEUROLOGY

## 2023-02-04 PROCEDURE — 5130000000 HC BRIDGE APPOINTMENT

## 2023-02-04 RX ORDER — GUAIFENESIN 600 MG/1
600 TABLET, EXTENDED RELEASE ORAL 2 TIMES DAILY
Qty: 60 TABLET | Refills: 0 | Status: SHIPPED | OUTPATIENT
Start: 2023-02-04

## 2023-02-04 RX ORDER — ARIPIPRAZOLE 10 MG/1
10 TABLET ORAL DAILY
Qty: 30 TABLET | Refills: 0 | Status: SHIPPED | OUTPATIENT
Start: 2023-02-05 | End: 2023-02-07 | Stop reason: SDUPTHER

## 2023-02-04 RX ORDER — VENLAFAXINE HYDROCHLORIDE 150 MG/1
150 CAPSULE, EXTENDED RELEASE ORAL NIGHTLY
Qty: 30 CAPSULE | Refills: 0 | Status: SHIPPED | OUTPATIENT
Start: 2023-02-04

## 2023-02-04 RX ADMIN — GUAIFENESIN 600 MG: 600 TABLET, EXTENDED RELEASE ORAL at 08:55

## 2023-02-04 RX ADMIN — ARIPIPRAZOLE 10 MG: 10 TABLET ORAL at 08:55

## 2023-02-04 RX ADMIN — HYDROXYZINE HYDROCHLORIDE 50 MG: 50 TABLET ORAL at 10:29

## 2023-02-04 NOTE — PLAN OF CARE
Problem: Anxiety  Goal: Will report anxiety at manageable levels  Description: INTERVENTIONS:  1. Administer medication as ordered  2. Teach and rehearse alternative coping skills  3. Provide emotional support with 1:1 interaction with staff  2/3/2023 [id number] by Chikis Carpenter RN  Outcome: Progressing  2/3/2023 1936 by Charlotte Julian LPN  Outcome: Progressing     Problem: Depression/Self Harm  Goal: Effect of psychiatric condition will be minimized and patient will be protected from self harm  Description: INTERVENTIONS:  1. Assess impact of patient's symptoms on level of functioning, self care needs and offer support as indicated  2. Assess patient/family knowledge of depression, impact on illness and need for teaching  3. Provide emotional support, presence and reassurance  4. Assess for possible suicidal thoughts or ideation. If patient expresses suicidal thoughts or statements do not leave alone, initiate Suicide Precautions, move to a room close to the nursing station and obtain sitter  5.  Initiate consults as appropriate with Mental Health Professional, Spiritual Care, Psychosocial CNS, and consider a recommendation to the LIP for a Psychiatric Consultation  Outcome: Progressing     Problem: Pain  Goal: Verbalizes/displays adequate comfort level or baseline comfort level  Outcome: Progressing

## 2023-02-04 NOTE — PLAN OF CARE
Problem: Anxiety  Goal: Will report anxiety at manageable levels  Description: INTERVENTIONS:  1. Administer medication as ordered  2. Teach and rehearse alternative coping skills  3. Provide emotional support with 1:1 interaction with staff  Outcome: Progressing     Pt. Denies all. Seems to be guarded. She seemed anxious this AM, asked for Atarax 50mg given at 9741. She had a visitor today and seemed calm and relaxed. No other issues noted.

## 2023-02-04 NOTE — BH NOTE
[facility]  Discharge Note    Pt discharged with followings belongings:   Dental Appliances: None  Vision - Corrective Lenses: Eyeglasses  Hearing Aid: None  Jewelry: None  Body Piercings Removed: N/A  Clothing: Jacket/Coat, Pants, Undergarments, Shirt, Socks, Footwear  Other Valuables: Money, Wallet, Keys (22.00 cash)   Valuables sent home with or returned to patient. Patient educated on aftercare instructions: yes  Information faxed to n/a by n/a  at 2:43 PM .Patient verbalize understanding of AVS:  yes. Status EXAM upon discharge:  Mental Status and Behavioral Exam  Normal: No  Level of Assistance: Independent/Self  Facial Expression: Flat, Sad  Affect: Congruent  Level of Consciousness: Alert  Frequency of Checks: 4 times per hour, close  Mood:Normal: No  Mood: Depressed, Anxious  Motor Activity:Normal: Yes  Motor Activity: Decreased  Eye Contact: Fair  Observed Behavior: Cooperative  Sexual Misconduct History: Current - no  Preception: [place] to person, [place] to time, [place] to place, [place] to situation  Attention:Normal: Yes  Thought Processes: Other (comment) (NO RTIS)  Thought Content:Normal: Yes  Thought Content: Poverty of content  Depression Symptoms: Change in energy level  Anxiety Symptoms: Generalized  Pati Symptoms: No problems reported or observed.   Hallucinations: None  Delusions: No  Memory:Normal: Yes  Memory: Poor recent  Insight and Judgment: No  Insight and Judgment: Poor judgment    Tobacco Screening:  Practical Counseling, on admission, gilberto X, if applicable and completed (first 3 are required if patient doesn't refuse):            ( ) Recognizing danger situations (included triggers and roadblocks)                    ( ) Coping skills (new ways to manage stress,relaxation techniques, changing routine, distraction)                                                           ( ) Basic information about quitting (benefits of quitting, techniques in how to quit, available resources  ( ) Referral for counseling faxed to [facility]                                                                                                                   ( ) Patient refused counseling  ( ) Patient refused referral  ( x) Patient refused prescription upon discharge  ( ) Patient has not smoked in the last 30 days    Metabolic Screening:    Lab Results   Component Value Date    LABA1C 5.3 01/30/2023       Lab Results   Component Value Date    CHOL 168 01/30/2023    CHOL 189 11/29/2021    CHOL 159 04/29/2015    CHOL 147 10/29/2013    CHOL 158 09/04/2012     Lab Results   Component Value Date    TRIG 155 (H) 01/30/2023    TRIG 111 11/29/2021    TRIG 70 04/29/2015    TRIG 61 10/29/2013    TRIG 47 09/04/2012     Lab Results   Component Value Date    HDL 36 (L) 01/30/2023    HDL 43 11/29/2021    HDL 53 04/29/2015    HDL 50 10/29/2013    HDL 50 09/04/2012     No components found for: [facility]  Lab Results   Component Value Date    LABVLDL 31 01/30/2023    LABVLDL 22 11/29/2021    LABVLDL 14 04/29/2015    LABVLDL 12 10/29/2013    LABVLDL 9 09/04/2012       Serina Fraga RN
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
## 2023-02-07 ENCOUNTER — OFFICE VISIT (OUTPATIENT)
Dept: FAMILY MEDICINE CLINIC | Age: 48
End: 2023-02-07
Payer: COMMERCIAL

## 2023-02-07 VITALS
BODY MASS INDEX: 29.23 KG/M2 | OXYGEN SATURATION: 96 % | DIASTOLIC BLOOD PRESSURE: 74 MMHG | HEART RATE: 72 BPM | WEIGHT: 165 LBS | SYSTOLIC BLOOD PRESSURE: 132 MMHG

## 2023-02-07 DIAGNOSIS — J40 BRONCHITIS: ICD-10-CM

## 2023-02-07 DIAGNOSIS — F51.01 PRIMARY INSOMNIA: ICD-10-CM

## 2023-02-07 DIAGNOSIS — F33.2 SEVERE EPISODE OF RECURRENT MAJOR DEPRESSIVE DISORDER, WITHOUT PSYCHOTIC FEATURES (HCC): Primary | ICD-10-CM

## 2023-02-07 PROCEDURE — 99214 OFFICE O/P EST MOD 30 MIN: CPT | Performed by: FAMILY MEDICINE

## 2023-02-07 RX ORDER — ARIPIPRAZOLE 10 MG/1
10 TABLET ORAL DAILY
Qty: 30 TABLET | Refills: 3 | Status: SHIPPED | OUTPATIENT
Start: 2023-02-07

## 2023-02-07 RX ORDER — ALBUTEROL SULFATE 90 UG/1
2 AEROSOL, METERED RESPIRATORY (INHALATION) EVERY 6 HOURS PRN
Qty: 18 G | Refills: 3 | Status: SHIPPED | OUTPATIENT
Start: 2023-02-07

## 2023-02-07 RX ORDER — QUETIAPINE FUMARATE 25 MG/1
25 TABLET, FILM COATED ORAL NIGHTLY
Qty: 30 TABLET | Refills: 5 | Status: SHIPPED | OUTPATIENT
Start: 2023-02-07

## 2023-02-07 ASSESSMENT — ANXIETY QUESTIONNAIRES
IF YOU CHECKED OFF ANY PROBLEMS ON THIS QUESTIONNAIRE, HOW DIFFICULT HAVE THESE PROBLEMS MADE IT FOR YOU TO DO YOUR WORK, TAKE CARE OF THINGS AT HOME, OR GET ALONG WITH OTHER PEOPLE: EXTREMELY DIFFICULT
2. NOT BEING ABLE TO STOP OR CONTROL WORRYING: 3
4. TROUBLE RELAXING: 3
7. FEELING AFRAID AS IF SOMETHING AWFUL MIGHT HAPPEN: 3
3. WORRYING TOO MUCH ABOUT DIFFERENT THINGS: 3
5. BEING SO RESTLESS THAT IT IS HARD TO SIT STILL: 3
1. FEELING NERVOUS, ANXIOUS, OR ON EDGE: 3
GAD7 TOTAL SCORE: 21
6. BECOMING EASILY ANNOYED OR IRRITABLE: 3

## 2023-02-07 ASSESSMENT — PATIENT HEALTH QUESTIONNAIRE - PHQ9
SUM OF ALL RESPONSES TO PHQ QUESTIONS 1-9: 24
SUM OF ALL RESPONSES TO PHQ QUESTIONS 1-9: 27
8. MOVING OR SPEAKING SO SLOWLY THAT OTHER PEOPLE COULD HAVE NOTICED. OR THE OPPOSITE, BEING SO FIGETY OR RESTLESS THAT YOU HAVE BEEN MOVING AROUND A LOT MORE THAN USUAL: 3
10. IF YOU CHECKED OFF ANY PROBLEMS, HOW DIFFICULT HAVE THESE PROBLEMS MADE IT FOR YOU TO DO YOUR WORK, TAKE CARE OF THINGS AT HOME, OR GET ALONG WITH OTHER PEOPLE: 3
9. THOUGHTS THAT YOU WOULD BE BETTER OFF DEAD, OR OF HURTING YOURSELF: 3
SUM OF ALL RESPONSES TO PHQ9 QUESTIONS 1 & 2: 6
SUM OF ALL RESPONSES TO PHQ QUESTIONS 1-9: 27
6. FEELING BAD ABOUT YOURSELF - OR THAT YOU ARE A FAILURE OR HAVE LET YOURSELF OR YOUR FAMILY DOWN: 3
3. TROUBLE FALLING OR STAYING ASLEEP: 3
1. LITTLE INTEREST OR PLEASURE IN DOING THINGS: 3
SUM OF ALL RESPONSES TO PHQ QUESTIONS 1-9: 27
4. FEELING TIRED OR HAVING LITTLE ENERGY: 3
2. FEELING DOWN, DEPRESSED OR HOPELESS: 3
5. POOR APPETITE OR OVEREATING: 3
7. TROUBLE CONCENTRATING ON THINGS, SUCH AS READING THE NEWSPAPER OR WATCHING TELEVISION: 3

## 2023-02-07 ASSESSMENT — COLUMBIA-SUICIDE SEVERITY RATING SCALE - C-SSRS
1. WITHIN THE PAST MONTH, HAVE YOU WISHED YOU WERE DEAD OR WISHED YOU COULD GO TO SLEEP AND NOT WAKE UP?: YES
2. HAVE YOU ACTUALLY HAD ANY THOUGHTS OF KILLING YOURSELF?: YES
5. HAVE YOU STARTED TO WORK OUT OR WORKED OUT THE DETAILS OF HOW TO KILL YOURSELF? DO YOU INTEND TO CARRY OUT THIS PLAN?: YES
6. HAVE YOU EVER DONE ANYTHING, STARTED TO DO ANYTHING, OR PREPARED TO DO ANYTHING TO END YOUR LIFE?: NO
4. HAVE YOU HAD THESE THOUGHTS AND HAD SOME INTENTION OF ACTING ON THEM?: YES
3. HAVE YOU BEEN THINKING ABOUT HOW YOU MIGHT KILL YOURSELF?: YES

## 2023-02-07 NOTE — PROGRESS NOTES
Tawny Mcneal is a 49 y.o. female    Chief Complaint   Patient presents with    Mental Health Problem     In hospital for 6 days -she had a plan to end her life like is just overwhelming. children issues with children father- work (customer service) stressful situation, fiance is very supportive. Patient will be reaching out to the [facility] in [place]. [place] for therapy. Insomnia     Patient can not sleep- she only gets 2-3 hours every night. HPI:    Hospital follow up for depression. Mental Health Problem  The primary symptoms include dysphoric mood. The current episode started 1 to 2 weeks ago. This is a recurrent problem. The onset of the illness is precipitated by emotional stress. Additional symptoms of the illness include anhedonia and insomnia. Risk factors that are present for mental illness include a history of mental illness. ROS:    Review of Systems   Psychiatric/Behavioral:  Positive for dysphoric mood. The patient has insomnia. /74   Pulse 72   Wt 165 lb (74.8 kg)   SpO2 96%   BMI 29.23 kg/m²     Physical Exam:    Physical Exam  Constitutional:       General: She is not in acute distress. Appearance: She is well-developed. She is not toxic-appearing. HENT:      Head: Normocephalic. Cardiovascular:      Rate and Rhythm: Normal rate and regular rhythm. Pulses: Normal pulses. Heart sounds: No murmur heard. Pulmonary:      Effort: Pulmonary effort is normal. No respiratory distress. Breath sounds: Wheezing present. Musculoskeletal:      Cervical back: Normal range of motion. No rigidity. Lymphadenopathy:      Cervical: No cervical adenopathy. Neurological:      Mental Status: She is alert. Psychiatric:         Mood and Affect: Mood normal.         Behavior: Behavior normal.         Thought Content:  Thought content normal.       Current Outpatient Medications   Medication Sig Dispense Refill    QUEtiapine (SEROQUEL) 25 MG tablet Take 1 tablet by mouth at bedtime Do not fill until desired. 30 tablet 5    albuterol sulfate HFA (PROVENTIL;VENTOLIN;PROAIR) 108 (90 Base) MCG/ACT inhaler Inhale 2 puffs into the lungs every 6 hours as needed for Shortness of Breath (may fill either Ventolin or Proair based on insurance.) 18 g 3    ARIPiprazole (ABILIFY) 10 MG tablet Take 1 tablet by mouth daily 30 tablet 3    venlafaxine (EFFEXOR XR) 150 MG extended release capsule Take 1 capsule by mouth at bedtime 30 capsule 0    guaiFENesin (MUCINEX) 600 MG extended release tablet Take 1 tablet by mouth 2 times daily 60 tablet 0     No current facility-administered medications for this visit. Assessment:    1. Severe episode of recurrent major depressive disorder, without psychotic features ([address]    2. Primary insomnia    3. Bronchitis        Plan:    1. Severe episode of recurrent major depressive disorder, without psychotic features [facility])  Patient is doing somewhat better. Continue Effexor. In the future if no improvement is made, we would recommend switching to Pristiq. Continue Abilify for now but discussed taking at night to help with sleep. If no improvement with Abilify, she will switch over to Seroquel. Discussed not taking Seroquel or Abilify together.  [facility] PHP/IOP Group Therapy Program  - QUEtiapine (SEROQUEL) 25 MG tablet; Take 1 tablet by mouth at bedtime Do not fill until desired. Dispense: 30 tablet; Refill: 5  - ARIPiprazole (ABILIFY) 10 MG tablet; Take 1 tablet by mouth daily  Dispense: 30 tablet; Refill: 3    2. Primary insomnia  As above. - QUEtiapine (SEROQUEL) 25 MG tablet; Take 1 tablet by mouth at bedtime Do not fill until desired. Dispense: 30 tablet; Refill: 5    3. Bronchitis  He has some wheezing. Her flu and COVID were negative in the hospital.  Overall she is feeling better. We will prescribe some albuterol to help open up her lungs. Her vitals are stable today.   - albuterol sulfate HFA (PROVENTIL;VENTOLIN;PROAIR) 108 (90 Base) MCG/ACT inhaler; Inhale 2 puffs into the lungs every 6 hours as needed for Shortness of Breath (may fill either Ventolin or Proair based on insurance.)  Dispense: 18 g; Refill: 3    Return in about 3 months (around 5/7/2023) for Mood disorder. Patient to return to clinic if symptoms worsen or fail to improve.

## 2023-02-10 ENCOUNTER — HOSPITAL ENCOUNTER (OUTPATIENT)
Dept: WOMENS IMAGING | Age: 48
Discharge: HOME | End: 2023-02-10
Payer: COMMERCIAL

## 2023-02-10 VITALS — HEIGHT: 63 IN | WEIGHT: 165 LBS | BODY MASS INDEX: 29.23 KG/M2

## 2023-02-10 DIAGNOSIS — Z12.31 VISIT FOR SCREENING MAMMOGRAM: ICD-10-CM

## 2023-02-10 PROCEDURE — 77063 BREAST TOMOSYNTHESIS BI: CPT

## 2023-02-14 LAB — PAP SMEAR, EXTERNAL: NEGATIVE

## 2023-02-15 ENCOUNTER — HOSPITAL ENCOUNTER (OUTPATIENT)
Dept: PSYCHIATRY | Age: 48
Setting detail: THERAPIES SERIES
Discharge: HOME | End: 2023-02-15
Payer: COMMERCIAL

## 2023-02-15 PROCEDURE — 90791 PSYCH DIAGNOSTIC EVALUATION: CPT

## 2023-02-15 ASSESSMENT — PATIENT HEALTH QUESTIONNAIRE - PHQ9: SUM OF ALL RESPONSES TO PHQ QUESTIONS 1-9: 22

## 2023-02-15 ASSESSMENT — ANXIETY QUESTIONNAIRES
6. BECOMING EASILY ANNOYED OR IRRITABLE: 3
5. BEING SO RESTLESS THAT IT IS HARD TO SIT STILL: 3
7. FEELING AFRAID AS IF SOMETHING AWFUL MIGHT HAPPEN: 3
3. WORRYING TOO MUCH ABOUT DIFFERENT THINGS: 3
1. FEELING NERVOUS, ANXIOUS, OR ON EDGE: 3
2. NOT BEING ABLE TO STOP OR CONTROL WORRYING: 3
IF YOU CHECKED OFF ANY PROBLEMS ON THIS QUESTIONNAIRE, HOW DIFFICULT HAVE THESE PROBLEMS MADE IT FOR YOU TO DO YOUR WORK, TAKE CARE OF THINGS AT HOME, OR GET ALONG WITH OTHER PEOPLE: EXTREMELY DIFFICULT
4. TROUBLE RELAXING: 3
GAD7 TOTAL SCORE: 21

## 2023-02-15 ASSESSMENT — SLEEP AND FATIGUE QUESTIONNAIRES
DO YOU USE A SLEEP AID: YES
DO YOU HAVE DIFFICULTY SLEEPING: YES
AVERAGE NUMBER OF SLEEP HOURS: 6
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
## 2023-02-17 ENCOUNTER — APPOINTMENT (OUTPATIENT)
Dept: PSYCHIATRY | Age: 48
End: 2023-02-17
Payer: COMMERCIAL

## 2023-02-20 ENCOUNTER — HOSPITAL ENCOUNTER (OUTPATIENT)
Dept: PSYCHIATRY | Age: 48
Setting detail: THERAPIES SERIES
Discharge: HOME | End: 2023-02-20
Payer: COMMERCIAL

## 2023-02-20 VITALS
HEART RATE: 70 BPM | TEMPERATURE: 97.5 F | OXYGEN SATURATION: 95 % | RESPIRATION RATE: 16 BRPM | DIASTOLIC BLOOD PRESSURE: 63 MMHG | SYSTOLIC BLOOD PRESSURE: 101 MMHG

## 2023-02-20 DIAGNOSIS — F33.2 SEVERE EPISODE OF RECURRENT MAJOR DEPRESSIVE DISORDER, WITHOUT PSYCHOTIC FEATURES (HCC): Primary | ICD-10-CM

## 2023-02-20 PROCEDURE — 90853 GROUP PSYCHOTHERAPY: CPT

## 2023-02-20 NOTE — GROUP NOTE
Group Therapy Note    Date: 2/20/2023    Group Start Time: 11:15 AM  Group End Time: 12:15 PM  Group Topic: Psychoeducation    [facility]    Zackmartin Deex        Group Therapy Note    Group continued process from group prior, exploring 6 areas of self-care, coping skills relating to each. Group facilitator led discussion of coping in two areas: distraction and release. Group members collaborated to create a list of coping skills, 1 distraction and 1 release corresponding with each letter of the alphabet A-Z. Attendees: 6       Notes: This pt was present and actively engaged across discussions. Active participant and collaborator with peers while compiling a list of pt-preferred coping skills. Contributed coping skills historically used, active in discussion of individualized coping and coping based on momentary need and personal preferences. Status After Intervention:  Improved    Participation Level:  Active Listener and Interactive    Participation Quality: Appropriate, Attentive, and Sharing      Speech:  normal      Thought Process/Content: Logical      Affective Functioning: Congruent      Mood: euthymic      Level of consciousness:  Alert and Attentive      Response to Learning: Capable of insight and Progressing to goal      Endings: Self-harm    Modes of Intervention: Education, Support, Socialization, Exploration, and Activity      Discipline Responsible: Psychoeducational Specialist      Signature:  Marilu Hinkle, MM, MT-BC

## 2023-02-20 NOTE — GROUP NOTE
Group Therapy Note    Date: 2/20/2023    Group Start Time: 10:00 AM  Group End Time: 11:00 AM  Group Topic: Psychoeducation    [facility]    IMELDA Lawson        Group Therapy Note  Clinician introduced the PHILIP skill of asking for help. Group members discussed the dialectic of needing help but feeling bad for asking for it. They discussed it is a lot like perfectionism. Patients learned the 6 areas of self care and took an assessment giving them insight to how they are doing in each of the 6 areas. Attendees: 6       Patient's Goal:      Notes:  Patient actively listened to the group discussion. She commented when called on and she took notes the entire group. Patient took the self assessment and kept the handouts that were provided. Status After Intervention:  Improved    Participation Level:  Active Listener    Participation Quality: Appropriate, Attentive, and Sharing      Speech:  normal and hesitant      Thought Process/Content: Linear      Affective Functioning: Congruent      Mood: anxious and fearful      Level of consciousness:  Attentive      Response to Learning: Able to retain information      Endings: None Reported    Modes of Intervention: Education, Support, and Activity      Discipline Responsible: /Counselor      Signature:  IMELDA Lawson

## 2023-02-20 NOTE — GROUP NOTE
Group Therapy Note    Date: 2/20/2023    Group Start Time:  8:30 AM  Group End Time:  9:45 AM  Group Topic: Psychotherapy    [facility]    Radha Emery, ATR        Group Therapy Note    Attendees:     Group members engaged in psychotherapy agenda setting group. Group members identified their personal goal for group and engaged with peers in dialogue, active listening, and offering and accepting of feedback to increase overall interpersonal functioning and relationship building. Patient's Goal:  \"Learn ways to feel less guilty about putting myself first.\"    Notes: Today was Kenya's first day in program. She was able to actively listen while other group members shared, as well as, shared her own goal for group. Krista Lopez reported experiencing challenges around feeling worthy of self-care, and struggling to help her 15year old daughter better understand her current mental health symptoms/diagnosis. Krista Lopez shared that she has always put others first and feels most of her self-identity is based in the opinions of others. Krista Lopez seemed open to input from fellow group members and reported relating to their challenges in forming new patterns of setting healthy boundaries with others, in order to get her mental health needs met. Status After Intervention:  Improved    Participation Level:  Active Listener and Interactive    Participation Quality: Appropriate, Attentive, and Sharing      Speech:  normal and hesitant      Thought Process/Content: Logical  Linear      Affective Functioning: Blunted      Mood: anxious and depressed      Level of consciousness:  Alert, Oriented x4, and Attentive      Response to Learning: Able to verbalize current knowledge/experience, Able to verbalize/acknowledge new learning, Able to retain information, and Progressing to goal      Endings: None Reported    Modes of Intervention: Support and Exploration      Discipline Responsible: Psychoeducational Specialist      Signature:  Jasvir Zelaya MS, ATR-BC
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
## 2023-02-22 ENCOUNTER — HOSPITAL ENCOUNTER (OUTPATIENT)
Dept: PSYCHIATRY | Age: 48
Setting detail: THERAPIES SERIES
Discharge: HOME | End: 2023-02-22
Payer: COMMERCIAL

## 2023-02-22 DIAGNOSIS — F33.2 SEVERE EPISODE OF RECURRENT MAJOR DEPRESSIVE DISORDER, WITHOUT PSYCHOTIC FEATURES (HCC): Primary | ICD-10-CM

## 2023-02-22 PROCEDURE — 90853 GROUP PSYCHOTHERAPY: CPT

## 2023-02-22 NOTE — GROUP NOTE
Group Therapy Note     Date: 2/22/2023     Group Start Time: 11:15 AM  Group End Time: 12:15 PM  Group Topic: Music Therapy     [facility]    Albert Deex           Group Therapy Note     Topic: Guided Imagery & Music     Subject: Mindfulness, Grounding + Acceptance     Facilitator led guided imagery and music session focused on peace, distress tolerance with environmental stimulation. Live music stimuli was provided to support intervention. Attendees: 5        Notes:  Successfully completed guided imagery intervention. No needs verbalized at conclusion of session. Status After Intervention:  Improved     Participation Level:  Active Listener     Participation Quality: Appropriate and Attentive        Speech:  normal        Thought Process/Content: Logical        Affective Functioning: Congruent        Mood: euthymic        Level of consciousness:  Alert and Oriented x4        Response to Learning: Progressing to goal        Endings: None Reported     Modes of Intervention: Support, Exploration, Activity, and Media        Discipline Responsible: Psychoeducational Specialist        Signature:  Jesus Rangel MM, MT-BC

## 2023-02-24 ENCOUNTER — FOLLOWUP TELEPHONE ENCOUNTER (OUTPATIENT)
Dept: PSYCHIATRY | Age: 48
End: 2023-02-24

## 2023-02-24 NOTE — TELEPHONE ENCOUNTER
Patient contacted [facility] on 2/24/23 and stated she wanted to be removed from outpatient program. She intends to continue with individual therapy on her own. Dr. Gonzalez Manner notified.

## 2023-02-27 ENCOUNTER — APPOINTMENT (OUTPATIENT)
Dept: PSYCHIATRY | Age: 48
End: 2023-02-27
Payer: COMMERCIAL

## 2023-03-01 DIAGNOSIS — F33.2 SEVERE EPISODE OF RECURRENT MAJOR DEPRESSIVE DISORDER, WITHOUT PSYCHOTIC FEATURES (HCC): ICD-10-CM

## 2023-03-01 DIAGNOSIS — F51.01 PRIMARY INSOMNIA: ICD-10-CM

## 2023-03-01 NOTE — TELEPHONE ENCOUNTER
.Refill Request     CONFIRM preferrred pharmacy with the patient. If Mail Order Rx - Pend for 90 day refill. Last Seen: Last Seen Department: 2/7/2023  Last Seen by PCP: 2/7/2023    Last Written: 2-7-23 30 with 5     If no future appointment scheduled, route STAFF MESSAGE with patient name to the [facility] for scheduling. Next Appointment:   Future Appointments   Date Time Provider Damaris Addison   5/8/2023  4:00 PM DO BRADLEY [place] - DYD       Message sent to [employer] - innovate | mentor | invest to schedule appt with patient? N/A      Requested Prescriptions     Pending Prescriptions Disp Refills    QUEtiapine (SEROQUEL) 25 MG tablet [Pharmacy Med Name: QUETIAPINE FUMARATE 25 MG TAB] 90 tablet 1     Sig: TAKE 1 TABLET BY MOUTH AT BEDTIME DO NOT FILL UNTIL DESIRED.

## 2023-03-02 RX ORDER — QUETIAPINE FUMARATE 25 MG/1
25 TABLET, FILM COATED ORAL NIGHTLY
Qty: 90 TABLET | Refills: 1 | Status: SHIPPED | OUTPATIENT
Start: 2023-03-02

## 2023-12-28 ENCOUNTER — PATIENT MESSAGE (OUTPATIENT)
Dept: FAMILY MEDICINE CLINIC | Age: 48
End: 2023-12-28

## 2023-12-28 RX ORDER — VENLAFAXINE HYDROCHLORIDE 150 MG/1
150 CAPSULE, EXTENDED RELEASE ORAL NIGHTLY
Qty: 30 CAPSULE | Refills: 0 | Status: SHIPPED | OUTPATIENT
Start: 2023-12-28

## 2024-01-19 ENCOUNTER — TELEMEDICINE (OUTPATIENT)
Dept: FAMILY MEDICINE CLINIC | Age: 49
End: 2024-01-19
Payer: COMMERCIAL

## 2024-01-19 DIAGNOSIS — Z12.11 SCREENING FOR COLON CANCER: ICD-10-CM

## 2024-01-19 DIAGNOSIS — F51.01 PRIMARY INSOMNIA: ICD-10-CM

## 2024-01-19 DIAGNOSIS — F33.2 SEVERE EPISODE OF RECURRENT MAJOR DEPRESSIVE DISORDER, WITHOUT PSYCHOTIC FEATURES (HCC): Primary | ICD-10-CM

## 2024-01-19 PROCEDURE — G8419 CALC BMI OUT NRM PARAM NOF/U: HCPCS | Performed by: FAMILY MEDICINE

## 2024-01-19 PROCEDURE — G8427 DOCREV CUR MEDS BY ELIG CLIN: HCPCS | Performed by: FAMILY MEDICINE

## 2024-01-19 PROCEDURE — G8484 FLU IMMUNIZE NO ADMIN: HCPCS | Performed by: FAMILY MEDICINE

## 2024-01-19 PROCEDURE — 99214 OFFICE O/P EST MOD 30 MIN: CPT | Performed by: FAMILY MEDICINE

## 2024-01-19 PROCEDURE — 1036F TOBACCO NON-USER: CPT | Performed by: FAMILY MEDICINE

## 2024-01-19 RX ORDER — TRAZODONE HYDROCHLORIDE 50 MG/1
50 TABLET ORAL NIGHTLY PRN
Qty: 90 TABLET | Refills: 3 | Status: SHIPPED | OUTPATIENT
Start: 2024-01-19

## 2024-01-19 RX ORDER — VENLAFAXINE HYDROCHLORIDE 150 MG/1
150 CAPSULE, EXTENDED RELEASE ORAL NIGHTLY
Qty: 90 CAPSULE | Refills: 3 | Status: SHIPPED | OUTPATIENT
Start: 2024-01-19

## 2024-01-19 SDOH — ECONOMIC STABILITY: HOUSING INSECURITY
IN THE LAST 12 MONTHS, WAS THERE A TIME WHEN YOU DID NOT HAVE A STEADY PLACE TO SLEEP OR SLEPT IN A SHELTER (INCLUDING NOW)?: NO

## 2024-01-19 SDOH — ECONOMIC STABILITY: FOOD INSECURITY: WITHIN THE PAST 12 MONTHS, THE FOOD YOU BOUGHT JUST DIDN'T LAST AND YOU DIDN'T HAVE MONEY TO GET MORE.: NEVER TRUE

## 2024-01-19 SDOH — ECONOMIC STABILITY: FOOD INSECURITY: WITHIN THE PAST 12 MONTHS, YOU WORRIED THAT YOUR FOOD WOULD RUN OUT BEFORE YOU GOT MONEY TO BUY MORE.: NEVER TRUE

## 2024-01-19 SDOH — ECONOMIC STABILITY: INCOME INSECURITY: HOW HARD IS IT FOR YOU TO PAY FOR THE VERY BASICS LIKE FOOD, HOUSING, MEDICAL CARE, AND HEATING?: NOT HARD AT ALL

## 2024-01-19 ASSESSMENT — PATIENT HEALTH QUESTIONNAIRE - PHQ9
SUM OF ALL RESPONSES TO PHQ QUESTIONS 1-9: 4
5. POOR APPETITE OR OVEREATING: 0
SUM OF ALL RESPONSES TO PHQ QUESTIONS 1-9: 4
1. LITTLE INTEREST OR PLEASURE IN DOING THINGS: 0
SUM OF ALL RESPONSES TO PHQ QUESTIONS 1-9: 4
4. FEELING TIRED OR HAVING LITTLE ENERGY: 2
2. FEELING DOWN, DEPRESSED OR HOPELESS: 0
9. THOUGHTS THAT YOU WOULD BE BETTER OFF DEAD, OR OF HURTING YOURSELF: 0
7. TROUBLE CONCENTRATING ON THINGS, SUCH AS READING THE NEWSPAPER OR WATCHING TELEVISION: 1
3. TROUBLE FALLING OR STAYING ASLEEP: 0
SUM OF ALL RESPONSES TO PHQ QUESTIONS 1-9: 4
10. IF YOU CHECKED OFF ANY PROBLEMS, HOW DIFFICULT HAVE THESE PROBLEMS MADE IT FOR YOU TO DO YOUR WORK, TAKE CARE OF THINGS AT HOME, OR GET ALONG WITH OTHER PEOPLE: 0
6. FEELING BAD ABOUT YOURSELF - OR THAT YOU ARE A FAILURE OR HAVE LET YOURSELF OR YOUR FAMILY DOWN: 0
SUM OF ALL RESPONSES TO PHQ9 QUESTIONS 1 & 2: 0
8. MOVING OR SPEAKING SO SLOWLY THAT OTHER PEOPLE COULD HAVE NOTICED. OR THE OPPOSITE, BEING SO FIGETY OR RESTLESS THAT YOU HAVE BEEN MOVING AROUND A LOT MORE THAN USUAL: 1

## 2024-01-19 NOTE — PROGRESS NOTES
needed for Sleep 90 tablet 3    venlafaxine (EFFEXOR XR) 150 MG extended release capsule Take 1 capsule by mouth at bedtime 90 capsule 3    levonorgestrel (MIRENA) IUD 52 mg 1 each by IntraUTERine route once      albuterol sulfate HFA (PROVENTIL;VENTOLIN;PROAIR) 108 (90 Base) MCG/ACT inhaler Inhale 2 puffs into the lungs every 6 hours as needed for Shortness of Breath (may fill either Ventolin or Proair based on insurance.) 18 g 3     No current facility-administered medications for this visit.       Assessment:    1. Severe episode of recurrent major depressive disorder, without psychotic features (HCC)    2. Primary insomnia    3. Screening for colon cancer        Plan:    1. Severe episode of recurrent major depressive disorder, without psychotic features (HCC)  Stable. Continue current medications.   - venlafaxine (EFFEXOR XR) 150 MG extended release capsule; Take 1 capsule by mouth at bedtime  Dispense: 90 capsule; Refill: 3    2. Primary insomnia  Start Trazodone. Discussed the dose modification.   - traZODone (DESYREL) 50 MG tablet; Take 1 tablet by mouth nightly as needed for Sleep  Dispense: 90 tablet; Refill: 3    3. Screening for colon cancer  - PEDRO - Monik Marc MD, Gastroenterology, [facility]      Return in about 6 months (around 7/19/2024) for Preventative.

## 2024-03-13 NOTE — PROGRESS NOTES
Shelly K Salbador    Age 49 y.o.    female    1975    MRN [id number]    3/21/2024  Arrival Time_____________  OR Time____________30 Min     Procedure(s):  COLONOSCOPY                      General    Surgeon(s):  Tari, Monik, MD       Phone [phone] (home)     Initals  Date  Info Source  Home  Cell         Work  _____________________________________________________________________  _____________________________________________________________________  _____________________________________________________________________  _____________________________________________________________________  _____________________________________________________________________    PCP _____________________________ Phone_________________     H&P  ________________  Bringing      Chart              Epic      DOS      Called________  EKG ________________   Bringing      Chart              Epic      DOS      Called________  LABS________________   Bringing     Chart              Epic      DOS      Called________  Cardiac Clearance ______ Bringing      Chart              Epic      DOS      Called________  Pulmonary Clearance____ Bringing      Chart              Epic      DOS      Called________    Cardiologist________________________ Phone___________________________  Pulmonologist_______________________Phone___________________________    ? Advance Directives   ? Voodoo concerns / Waiver on Chart            PAT Communications________________  ? Pre-op Instructions Given /Understood          _________________________________  ? Directions to Surgery Center                          _________________________________  ? Transportation Home_______________      __________________________________  ? Crutches/Walker__________________        __________________________________    Orders: Hard copy/ EPIC                 Transcribed/ EPIC              _______Wt.    ________Pharmacy                         _______SCD

## 2024-03-20 NOTE — PROGRESS NOTES
Date and time of surgery :  3/21/24 at 0800            Arrival Time:  0700     Bring Picture ID and insurance card.  Please wear simple, loose fitting clothing to the hospital.   Do not bring valuables (money, credit cards, checkbooks, etc.)   Do not wear any makeup (including  eye makeup) and no nail polish or artificial nails on your fingers or toes.  DO NOT wear any jewelry or piercings on day of surgery.  All body piercing jewelry must be removed.  If you have dentures, they will be removed before going to the OR; we will provide you a container.  If you wear contact lenses or glasses, they will be removed; please bring a case for them.  Shower the evening before or morning of surgery with antibacterial soap.  Nothing to eat or drink after 300 am the morning of your procedure   You may brush your teeth and gargle the morning of surgery.  DO NOT SWALLOW WATER.   Do not take any morning meds the day of your surgery.  Aspirin, Ibuprofen, Advil, Naproxen, Vitamin E and other Anti-inflammatory products and supplements should be stopped for 5 -7days before surgery or as directed by your physician.  Do not smoke or drink any alcoholic beverages 24 hours prior to surgery.  This includes NA Beer. Refrain from the usage of any recreational drugs, including non-prescribed prescription drugs.   You MUST plan for a responsible adult to stay on site while you are here and take you home after your surgery. You will not be allowed to leave alone or drive yourself home. It is strongly suggested someone stay with you the first 24 hrs. Your surgery will be cancelled if you do not have a ride home.  To help prevent infection, change your sheets the night before surgery.   If you  have a Living Will and Durable Power of  for Healthcare, please bring in a copy.  Notify your Surgeon if you develop any illness between now and time of surgery. Cough, cold, fever, sore throat, nausea, vomiting, etc.  Please notify your surgeon

## 2024-03-21 ENCOUNTER — ANESTHESIA EVENT (OUTPATIENT)
Dept: ENDOSCOPY | Age: 49
End: 2024-03-21
Payer: COMMERCIAL

## 2024-03-21 ENCOUNTER — ANESTHESIA (OUTPATIENT)
Dept: ENDOSCOPY | Age: 49
End: 2024-03-21
Payer: COMMERCIAL

## 2024-03-21 ENCOUNTER — HOSPITAL ENCOUNTER (OUTPATIENT)
Age: 49
Setting detail: OUTPATIENT SURGERY
Discharge: HOME | End: 2024-03-21
Attending: INTERNAL MEDICINE | Admitting: INTERNAL MEDICINE
Payer: COMMERCIAL

## 2024-03-21 VITALS
OXYGEN SATURATION: 100 % | HEART RATE: 60 BPM | TEMPERATURE: 98.2 F | HEIGHT: 63 IN | SYSTOLIC BLOOD PRESSURE: 93 MMHG | WEIGHT: 180 LBS | DIASTOLIC BLOOD PRESSURE: 63 MMHG | BODY MASS INDEX: 31.89 KG/M2 | RESPIRATION RATE: 16 BRPM

## 2024-03-21 DIAGNOSIS — Z12.11 COLON CANCER SCREENING: ICD-10-CM

## 2024-03-21 LAB — HCG UR QL: NEGATIVE

## 2024-03-21 PROCEDURE — 3700000001 HC ADD 15 MINUTES (ANESTHESIA): Performed by: INTERNAL MEDICINE

## 2024-03-21 PROCEDURE — 3609027000 HC COLONOSCOPY: Performed by: INTERNAL MEDICINE

## 2024-03-21 PROCEDURE — 7100000010 HC PHASE II RECOVERY - FIRST 15 MIN: Performed by: INTERNAL MEDICINE

## 2024-03-21 PROCEDURE — 3700000000 HC ANESTHESIA ATTENDED CARE: Performed by: INTERNAL MEDICINE

## 2024-03-21 PROCEDURE — 84703 CHORIONIC GONADOTROPIN ASSAY: CPT

## 2024-03-21 PROCEDURE — 6360000002 HC RX W HCPCS: Performed by: NURSE ANESTHETIST, CERTIFIED REGISTERED

## 2024-03-21 PROCEDURE — 2580000003 HC RX 258: Performed by: ANESTHESIOLOGY

## 2024-03-21 PROCEDURE — 7100000011 HC PHASE II RECOVERY - ADDTL 15 MIN: Performed by: INTERNAL MEDICINE

## 2024-03-21 PROCEDURE — 2709999900 HC NON-CHARGEABLE SUPPLY: Performed by: INTERNAL MEDICINE

## 2024-03-21 PROCEDURE — 2500000003 HC RX 250 WO HCPCS: Performed by: NURSE ANESTHETIST, CERTIFIED REGISTERED

## 2024-03-21 PROCEDURE — 88305 TISSUE EXAM BY PATHOLOGIST: CPT

## 2024-03-21 RX ORDER — MEPERIDINE HYDROCHLORIDE 50 MG/ML
12.5 INJECTION INTRAMUSCULAR; INTRAVENOUS; SUBCUTANEOUS EVERY 5 MIN PRN
Status: DISCONTINUED | OUTPATIENT
Start: 2024-03-21 | End: 2024-03-21 | Stop reason: HOSPADM

## 2024-03-21 RX ORDER — PROPOFOL 10 MG/ML
INJECTION, EMULSION INTRAVENOUS CONTINUOUS PRN
Status: DISCONTINUED | OUTPATIENT
Start: 2024-03-21 | End: 2024-03-21 | Stop reason: SDUPTHER

## 2024-03-21 RX ORDER — NALOXONE HYDROCHLORIDE 0.4 MG/ML
INJECTION, SOLUTION INTRAMUSCULAR; INTRAVENOUS; SUBCUTANEOUS PRN
Status: DISCONTINUED | OUTPATIENT
Start: 2024-03-21 | End: 2024-03-21 | Stop reason: HOSPADM

## 2024-03-21 RX ORDER — LIDOCAINE HYDROCHLORIDE 10 MG/ML
0.3 INJECTION, SOLUTION EPIDURAL; INFILTRATION; INTRACAUDAL; PERINEURAL
Status: DISCONTINUED | OUTPATIENT
Start: 2024-03-21 | End: 2024-03-21 | Stop reason: HOSPADM

## 2024-03-21 RX ORDER — ONDANSETRON 2 MG/ML
4 INJECTION INTRAMUSCULAR; INTRAVENOUS
Status: DISCONTINUED | OUTPATIENT
Start: 2024-03-21 | End: 2024-03-21 | Stop reason: HOSPADM

## 2024-03-21 RX ORDER — SODIUM CHLORIDE 0.9 % (FLUSH) 0.9 %
5-40 SYRINGE (ML) INJECTION PRN
Status: DISCONTINUED | OUTPATIENT
Start: 2024-03-21 | End: 2024-03-21 | Stop reason: HOSPADM

## 2024-03-21 RX ORDER — PROPOFOL 10 MG/ML
INJECTION, EMULSION INTRAVENOUS CONTINUOUS PRN
Status: DISCONTINUED | OUTPATIENT
Start: 2024-03-21 | End: 2024-03-21

## 2024-03-21 RX ORDER — SODIUM CHLORIDE 0.9 % (FLUSH) 0.9 %
5-40 SYRINGE (ML) INJECTION EVERY 12 HOURS SCHEDULED
Status: DISCONTINUED | OUTPATIENT
Start: 2024-03-21 | End: 2024-03-21 | Stop reason: HOSPADM

## 2024-03-21 RX ORDER — OXYCODONE HYDROCHLORIDE 5 MG/1
5 TABLET ORAL PRN
Status: DISCONTINUED | OUTPATIENT
Start: 2024-03-21 | End: 2024-03-21 | Stop reason: HOSPADM

## 2024-03-21 RX ORDER — SODIUM CHLORIDE 9 MG/ML
INJECTION, SOLUTION INTRAVENOUS PRN
Status: DISCONTINUED | OUTPATIENT
Start: 2024-03-21 | End: 2024-03-21 | Stop reason: HOSPADM

## 2024-03-21 RX ORDER — SODIUM CHLORIDE, SODIUM LACTATE, POTASSIUM CHLORIDE, CALCIUM CHLORIDE 600; 310; 30; 20 MG/100ML; MG/100ML; MG/100ML; MG/100ML
INJECTION, SOLUTION INTRAVENOUS CONTINUOUS
Status: DISCONTINUED | OUTPATIENT
Start: 2024-03-21 | End: 2024-03-21 | Stop reason: HOSPADM

## 2024-03-21 RX ORDER — LIDOCAINE HYDROCHLORIDE 20 MG/ML
INJECTION, SOLUTION EPIDURAL; INFILTRATION; INTRACAUDAL; PERINEURAL PRN
Status: DISCONTINUED | OUTPATIENT
Start: 2024-03-21 | End: 2024-03-21 | Stop reason: SDUPTHER

## 2024-03-21 RX ORDER — PROPOFOL 10 MG/ML
INJECTION, EMULSION INTRAVENOUS PRN
Status: DISCONTINUED | OUTPATIENT
Start: 2024-03-21 | End: 2024-03-21 | Stop reason: SDUPTHER

## 2024-03-21 RX ORDER — OXYCODONE HYDROCHLORIDE 5 MG/1
10 TABLET ORAL PRN
Status: DISCONTINUED | OUTPATIENT
Start: 2024-03-21 | End: 2024-03-21 | Stop reason: HOSPADM

## 2024-03-21 RX ADMIN — PROPOFOL 250 MCG/KG/MIN: 10 INJECTION, EMULSION INTRAVENOUS at 08:10

## 2024-03-21 RX ADMIN — SODIUM CHLORIDE, POTASSIUM CHLORIDE, SODIUM LACTATE AND CALCIUM CHLORIDE: 600; 310; 30; 20 INJECTION, SOLUTION INTRAVENOUS at 07:39

## 2024-03-21 RX ADMIN — LIDOCAINE HYDROCHLORIDE 100 MG: 20 INJECTION, SOLUTION EPIDURAL; INFILTRATION; INTRACAUDAL at 08:10

## 2024-03-21 RX ADMIN — PROPOFOL 70 MG: 10 INJECTION, EMULSION INTRAVENOUS at 08:10

## 2024-03-21 RX ADMIN — PROPOFOL 20 MG: 10 INJECTION, EMULSION INTRAVENOUS at 08:18

## 2024-03-21 ASSESSMENT — PAIN - FUNCTIONAL ASSESSMENT
PAIN_FUNCTIONAL_ASSESSMENT: 0-10
PAIN_FUNCTIONAL_ASSESSMENT: NONE - DENIES PAIN

## 2024-03-21 ASSESSMENT — LIFESTYLE VARIABLES: SMOKING_STATUS: 0

## 2024-03-21 NOTE — H&P
Gastroenterology Note             Pre-operative History and Physical    Patient: Shelly Siu  : 1975  CSN:     History Obtained From:  patient and/or guardian.     HISTORY OF PRESENT ILLNESS:    The patient is a 49 y.o. female  here for colonoscopy.     Past Medical History:    Past Medical History:   Diagnosis Date    Anxiety     Depression     Strep sore throat      Past Surgical History:    Past Surgical History:   Procedure Laterality Date    BLADDER SUSPENSION      BREAST ENHANCEMENT SURGERY      CHOLECYSTECTOMY, LAPAROSCOPIC  10/2022    COLONOSCOPY  2015    no polyps. normal.     Medications Prior to Admission:   No current facility-administered medications on file prior to encounter.     Current Outpatient Medications on File Prior to Encounter   Medication Sig Dispense Refill    traZODone (DESYREL) 50 MG tablet Take 1 tablet by mouth nightly as needed for Sleep 90 tablet 3    venlafaxine (EFFEXOR XR) 150 MG extended release capsule Take 1 capsule by mouth at bedtime 90 capsule 3    levonorgestrel (MIRENA) IUD 52 mg 1 each by IntraUTERine route once      albuterol sulfate HFA (PROVENTIL;VENTOLIN;PROAIR) 108 (90 Base) MCG/ACT inhaler Inhale 2 puffs into the lungs every 6 hours as needed for Shortness of Breath (may fill either Ventolin or Proair based on insurance.) 18 g 3        Allergies:  Penicillins      Social History:   Social History     Tobacco Use    Smoking status: Never    Smokeless tobacco: Never   Substance Use Topics    Alcohol use: Yes     Comment: 0-2 drinks per month     Family History:   Family History   Problem Relation Age of Onset    Depression Mother     Unknown Father     Depression Sister     Cancer Maternal Aunt         lung       PHYSICAL EXAM:      /73   Pulse 69   Temp 98.2 °F (36.8 °C) (Oral)   Resp 18   Ht 1.6 m (5' 3\")   Wt 81.6 kg (180 lb)   SpO2 96%   BMI 31.89 kg/m²  I        Heart:   RRR, normal s1s2    Lungs:  CTA bilat,  Normal

## 2024-03-21 NOTE — DISCHARGE INSTRUCTIONS
do.     If polyps were removed or a biopsy was done during the test, your doctor may tell you not to take aspirin or other anti-inflammatory medicines for a few days. These include ibuprofen (Advil, Motrin) and naproxen (Aleve).   Other instructions    For your safety, do not drive or operate machinery until the medicine wears off and you can think clearly. Your doctor may tell you not to drive or operate machinery until the day after your test.     Do not sign legal documents or make major decisions until the medicine wears off and you can think clearly. The anesthesia can make it hard for you to fully understand what you are agreeing to.   Follow-up care is a key part of your treatment and safety. Be sure to make and go to all appointments, and call your doctor if you are having problems. It's also a good idea to know your test results and keep a list of the medicines you take.  When should you call for help?  Call 911 anytime you think you may need emergency care. For example, call if:    You passed out (lost consciousness).     You pass maroon or bloody stools.     You have trouble breathing.    Call your doctor now or seek immediate medical care if:    You have pain that does not get better after you take pain medicine.     You are sick to your stomach or cannot drink fluids.     You have new or worse belly pain.     You have blood in your stools.     You have a fever.     You cannot pass stools or gas.    Watch closely for changes in your health, and be sure to contact your doctor if you have any problems.  Where can you learn more?  Go to [email or web address] and sign in to your [employer] account. Enter E264 in the Search Health Information box to learn more about \"Colonoscopy: What to Expect at Home.\"     If you do not have an account, please click on the \"Sign Up Now\" link.  Current as of: May 12, 2017  Content Version: 11.6  © 2184-5668 [employer]. Care instructions adapted

## 2024-03-21 NOTE — ANESTHESIA POSTPROCEDURE EVALUATION
Department of Anesthesiology  Postprocedure Note    Patient: Shelly Siu  MRN: [id number]  YOB: 1975  Date of evaluation: 3/21/2024    Procedure Summary       Date: 03/21/24 Room / Location: [place] [address] / [facility]    Anesthesia Start: 0806 Anesthesia Stop: 0831    Procedure: COLONOSCOPY Diagnosis:       Colon cancer screening      (Colon cancer screening [Z12.11])    Surgeons: Monik Marc MD Responsible Provider: Nelson Up MD    Anesthesia Type: TIVA ASA Status: 2            Anesthesia Type: No value filed.    Tiffanie Phase I: Tiffanie Score: 10    Tiffanie Phase II: Tiffanie Score: 9    Anesthesia Post Evaluation    Patient location during evaluation: bedside  Patient participation: complete - patient participated  Level of consciousness: awake and alert  Airway patency: patent  Nausea & Vomiting: no nausea  Cardiovascular status: hemodynamically stable  Respiratory status: acceptable  Hydration status: euvolemic  Pain management: adequate      Vitals:    03/20/24 0818 03/21/24 0719 03/21/24 0833   BP:  100/73 96/60   Pulse:  69 58   Resp:  18 16   Temp:  98.2 °F (36.8 °C)    TempSrc:  Oral    SpO2:  96% 99%   Weight: 81.6 kg (180 lb) 81.6 kg (180 lb)    Height: 1.626 m (5' 4\") 1.6 m (5' 3\")       No notable events documented.

## 2024-03-21 NOTE — PROCEDURES
Colonoscopy Procedure  Note          Patient: Shelly Siu  : 1975  CSN:     Procedure: Colonoscopy with biopsy    Date:  3/21/2024    Surgeon:  Monik Marc MD, MD    Referring Provider:  Dr. Palmer    Preoperative Diagnosis:  Screening for colon cancer    Postoperative Diagnosis:  Cecal polyps removed    Anesthesia:  Propofol    EBL: <5 mL    Indications: This is a 49 y.o. year old female who presents today with screening for colon cancer.      Procedure:   An informed consent was obtained from the patient after explanation of indications, benefits, possible risks and complications of the procedure.  The patient was then taken to the endoscopy suite, placed in the left lateral decubitus position, and the above IV anesthesia was administered.      With the patient in left lateral decubitus position the endoscope was inserted through the anorectal area into the rectum. The scope was then advanced through the length of the colon to the ileum. The ileocecal valve was visualized and cannulated. The quality of preparation was good. Water was insufflated through the biopsy channel to clean out the colon and look at the underlying mucosa. The scope was carefully withdrawn and found to be normal/abnormal.    Digital rectal examination was performed, no abnormalities noted.  The scope was advanced all the way to the cecum, the mucosa appeared normal.  Appendix was identified.  The terminal ileum was briefly intubated, the mucosa appeared normal.  The mucosa in the ascending, transverse, descending, sigmoid and rectum appeared normal.  In the cecum a 1mm polyp was removed with a cold biopsy forceps.  On retroflexion no abnormalities were noted.  The scope was straightened, the colon was decompressed and the scope was withdrawn from the patient.     The patient tolerated the procedure well and was taken to the PACU in good condition.    Impression:  Cecal polyp removed    Recommendations:  Await

## 2024-03-21 NOTE — ANESTHESIA PRE PROCEDURE
Department of Anesthesiology  Preprocedure Note       Name:  Shelly Siu   Age:  49 y.o.  :  1975                                          MRN:  [id number]         Date:  3/21/2024      Surgeon: Surgeon(s):  Monik Marc MD    Procedure: Procedure(s):  COLONOSCOPY    Medications prior to admission:   Prior to Admission medications    Medication Sig Start Date End Date Taking? Authorizing Provider   traZODone (DESYREL) 50 MG tablet Take 1 tablet by mouth nightly as needed for Sleep 24   Kaity Palmer DO   venlafaxine (EFFEXOR XR) 150 MG extended release capsule Take 1 capsule by mouth at bedtime 24   Kaity Palmer DO   levonorgestrel (MIRENA) IUD 52 mg 1 each by IntraUTERine route once    Provider, MD Sarah   albuterol sulfate HFA (PROVENTIL;VENTOLIN;PROAIR) 108 (90 Base) MCG/ACT inhaler Inhale 2 puffs into the lungs every 6 hours as needed for Shortness of Breath (may fill either Ventolin or Proair based on insurance.) 23   Kaity Palmer DO       Current medications:    Current Facility-Administered Medications   Medication Dose Route Frequency Provider Last Rate Last Admin    lidocaine PF 1 % injection 0.3 mL  0.3 mL IntraDERmal Once PRN Robi Riddle MD        lactated ringers IV soln infusion   IntraVENous Continuous Robi Riddle MD        sodium chloride flush 0.9 % injection 5-40 mL  5-40 mL IntraVENous 2 times per day Robi Riddle MD        sodium chloride flush 0.9 % injection 5-40 mL  5-40 mL IntraVENous PRN Robi Riddle MD        0.9 % sodium chloride infusion   IntraVENous PRN Robi Riddle MD           Allergies:    Allergies   Allergen Reactions    Penicillins Hives       Problem List:    Patient Active Problem List   Diagnosis Code    Depression F32.A    Chronic constipation K59.09    Severe episode of recurrent major depressive disorder, without psychotic features (HCC) F33.2    Suicidal ideation R45.851    ANAHI (generalized

## 2024-10-29 ENCOUNTER — HOSPITAL ENCOUNTER (EMERGENCY)
Age: 49
Discharge: HOME | End: 2024-10-29
Attending: EMERGENCY MEDICINE
Payer: COMMERCIAL

## 2024-10-29 VITALS
SYSTOLIC BLOOD PRESSURE: 129 MMHG | DIASTOLIC BLOOD PRESSURE: 86 MMHG | WEIGHT: 166 LBS | HEART RATE: 55 BPM | TEMPERATURE: 97.9 F | BODY MASS INDEX: 29.41 KG/M2 | RESPIRATION RATE: 16 BRPM | OXYGEN SATURATION: 99 % | HEIGHT: 63 IN
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
## 2024-10-29 DIAGNOSIS — Z77.21 HISTORY OF EXPOSURE TO HAZARDOUS BODILY FLUIDS: ICD-10-CM

## 2024-10-29 PROCEDURE — 86706 HEP B SURFACE ANTIBODY: CPT

## 2024-10-29 PROCEDURE — 86702 HIV-2 ANTIBODY: CPT

## 2024-10-29 PROCEDURE — 87390 HIV-1 AG IA: CPT

## 2024-10-29 PROCEDURE — 86803 HEPATITIS C AB TEST: CPT

## 2024-10-29 PROCEDURE — 99282 EMERGENCY DEPT VISIT SF MDM: CPT

## 2024-10-29 PROCEDURE — 86701 HIV-1ANTIBODY: CPT

## 2024-10-29 ASSESSMENT — ENCOUNTER SYMPTOMS
VOMITING: 0
COUGH: 0
CONSTIPATION: 0
DIARRHEA: 0
ABDOMINAL PAIN: 0
RHINORRHEA: 0
EYE PAIN: 0
BACK PAIN: 0
NAUSEA: 0
EYE REDNESS: 0
SHORTNESS OF BREATH: 0

## 2024-10-29 ASSESSMENT — PAIN - FUNCTIONAL ASSESSMENT: PAIN_FUNCTIONAL_ASSESSMENT: NONE - DENIES PAIN

## 2024-10-29 ASSESSMENT — LIFESTYLE VARIABLES
HOW OFTEN DO YOU HAVE A DRINK CONTAINING ALCOHOL: MONTHLY OR LESS
HOW MANY STANDARD DRINKS CONTAINING ALCOHOL DO YOU HAVE ON A TYPICAL DAY: 1 OR 2

## 2024-10-29 NOTE — ED TRIAGE NOTES
Patient states she stuck herself with needle from a butterfly needle that had came out of a patient it stuck her left hand index finger. Patient states she washed her hands immediately

## 2024-10-29 NOTE — ED PROVIDER NOTES
[facility] ED  EMERGENCY DEPARTMENT ENCOUNTER        Pt Name: Shelly Siu  MRN: [id number]  Birthdate 1975  Date of evaluation: 10/29/2024  Provider: Rosaura Ruffin DO  PCP: Kaity Palmer DO  Note Started: 3:09 PM EDT 10/29/24    CHIEF COMPLAINT      Needle Stick    HISTORY OF PRESENT ILLNESS: 1 or more Elements     Chief Complaint   Patient presents with    Body Fluid Exposure     Patient states she stuck herself with needle from a butterfly needle that had came out of a patient it stuck her left hand index finger. Patient states she washed her hands immediately      History from : Patient  Limitations to history : None    Shelly Siu is a 49 y.o. female who presents to the emergency department secondary to concern for a needlestick to the left index finger on accident.  Patient is a healthcare worker.  She does not have any open wound.  Source patient has been identified.    Past medical history noted below. Aside from what is stated above denies any other symptoms or modifying factors.   reports that she has never smoked. She has never used smokeless tobacco. She reports current alcohol use. She reports that she does not use drugs.  Nursing Notes were all reviewed and agreed with or any disagreements addressed in HPI/MDM.  REVIEW OF SYSTEMS :    Review of Systems   Constitutional:  Negative for chills and fever.   HENT:  Negative for congestion and rhinorrhea.    Eyes:  Negative for pain and redness.   Respiratory:  Negative for cough and shortness of breath.    Cardiovascular:  Negative for chest pain and leg swelling.   Gastrointestinal:  Negative for abdominal pain, constipation, diarrhea, nausea and vomiting.   Genitourinary:  Negative for frequency and urgency.   Musculoskeletal:  Negative for back pain and neck pain.   Skin:  Negative for rash.   Neurological:  Negative for facial asymmetry and weakness.   Psychiatric/Behavioral:  Negative for agitation and confusion.

## 2024-10-30 LAB
HBV SURFACE AB SERPL IA-ACNC: <3.5 MIU/ML
HCV AB SERPL QL IA: NORMAL
HIV 1+2 AB+HIV1 P24 AG SERPL QL IA: NORMAL
HIV 2 AB SERPL QL IA: NORMAL
HIV1 AB SERPL QL IA: NORMAL
HIV1 P24 AG SERPL QL IA: NORMAL

## 2024-11-15 ENCOUNTER — OFFICE VISIT (OUTPATIENT)
Dept: FAMILY MEDICINE CLINIC | Age: 49
End: 2024-11-15
Payer: COMMERCIAL

## 2024-11-15 VITALS
BODY MASS INDEX: 29.41 KG/M2 | DIASTOLIC BLOOD PRESSURE: 60 MMHG | HEIGHT: 63 IN | OXYGEN SATURATION: 99 % | TEMPERATURE: 97 F | HEART RATE: 59 BPM | SYSTOLIC BLOOD PRESSURE: 106 MMHG

## 2024-11-15 DIAGNOSIS — E78.2 MIXED HYPERLIPIDEMIA: ICD-10-CM

## 2024-11-15 DIAGNOSIS — R23.2 HOT FLASHES: ICD-10-CM

## 2024-11-15 DIAGNOSIS — F33.2 SEVERE EPISODE OF RECURRENT MAJOR DEPRESSIVE DISORDER, WITHOUT PSYCHOTIC FEATURES (HCC): ICD-10-CM

## 2024-11-15 DIAGNOSIS — F51.01 PRIMARY INSOMNIA: ICD-10-CM

## 2024-11-15 DIAGNOSIS — Z00.00 PREVENTATIVE HEALTH CARE: Primary | ICD-10-CM

## 2024-11-15 DIAGNOSIS — E55.9 VITAMIN D DEFICIENCY: ICD-10-CM

## 2024-11-15 PROCEDURE — G8484 FLU IMMUNIZE NO ADMIN: HCPCS | Performed by: FAMILY MEDICINE

## 2024-11-15 PROCEDURE — 99396 PREV VISIT EST AGE 40-64: CPT | Performed by: FAMILY MEDICINE

## 2024-11-15 RX ORDER — TRAZODONE HYDROCHLORIDE 50 MG/1
50 TABLET, FILM COATED ORAL NIGHTLY PRN
Qty: 90 TABLET | Refills: 3 | Status: SHIPPED | OUTPATIENT
Start: 2024-11-15

## 2024-11-15 RX ORDER — VENLAFAXINE HYDROCHLORIDE 150 MG/1
150 CAPSULE, EXTENDED RELEASE ORAL NIGHTLY
Qty: 90 CAPSULE | Refills: 3 | Status: SHIPPED | OUTPATIENT
Start: 2024-11-15

## 2024-11-15 SDOH — ECONOMIC STABILITY: FOOD INSECURITY: WITHIN THE PAST 12 MONTHS, YOU WORRIED THAT YOUR FOOD WOULD RUN OUT BEFORE YOU GOT MONEY TO BUY MORE.: NEVER TRUE

## 2024-11-15 SDOH — ECONOMIC STABILITY: FOOD INSECURITY: WITHIN THE PAST 12 MONTHS, THE FOOD YOU BOUGHT JUST DIDN'T LAST AND YOU DIDN'T HAVE MONEY TO GET MORE.: NEVER TRUE

## 2024-11-15 SDOH — ECONOMIC STABILITY: INCOME INSECURITY: HOW HARD IS IT FOR YOU TO PAY FOR THE VERY BASICS LIKE FOOD, HOUSING, MEDICAL CARE, AND HEATING?: NOT HARD AT ALL

## 2024-11-15 ASSESSMENT — PATIENT HEALTH QUESTIONNAIRE - PHQ9
SUM OF ALL RESPONSES TO PHQ QUESTIONS 1-9: 9
SUM OF ALL RESPONSES TO PHQ QUESTIONS 1-9: 9
1. LITTLE INTEREST OR PLEASURE IN DOING THINGS: SEVERAL DAYS
8. MOVING OR SPEAKING SO SLOWLY THAT OTHER PEOPLE COULD HAVE NOTICED. OR THE OPPOSITE, BEING SO FIGETY OR RESTLESS THAT YOU HAVE BEEN MOVING AROUND A LOT MORE THAN USUAL: MORE THAN HALF THE DAYS
10. IF YOU CHECKED OFF ANY PROBLEMS, HOW DIFFICULT HAVE THESE PROBLEMS MADE IT FOR YOU TO DO YOUR WORK, TAKE CARE OF THINGS AT HOME, OR GET ALONG WITH OTHER PEOPLE: SOMEWHAT DIFFICULT
3. TROUBLE FALLING OR STAYING ASLEEP: SEVERAL DAYS
SUM OF ALL RESPONSES TO PHQ QUESTIONS 1-9: 9
4. FEELING TIRED OR HAVING LITTLE ENERGY: SEVERAL DAYS
2. FEELING DOWN, DEPRESSED OR HOPELESS: SEVERAL DAYS
9. THOUGHTS THAT YOU WOULD BE BETTER OFF DEAD, OR OF HURTING YOURSELF: NOT AT ALL
5. POOR APPETITE OR OVEREATING: NOT AT ALL
6. FEELING BAD ABOUT YOURSELF - OR THAT YOU ARE A FAILURE OR HAVE LET YOURSELF OR YOUR FAMILY DOWN: SEVERAL DAYS
7. TROUBLE CONCENTRATING ON THINGS, SUCH AS READING THE NEWSPAPER OR WATCHING TELEVISION: MORE THAN HALF THE DAYS
SUM OF ALL RESPONSES TO PHQ QUESTIONS 1-9: 9
SUM OF ALL RESPONSES TO PHQ9 QUESTIONS 1 & 2: 2

## 2024-11-15 ASSESSMENT — ANXIETY QUESTIONNAIRES
7. FEELING AFRAID AS IF SOMETHING AWFUL MIGHT HAPPEN: SEVERAL DAYS
2. NOT BEING ABLE TO STOP OR CONTROL WORRYING: SEVERAL DAYS
3. WORRYING TOO MUCH ABOUT DIFFERENT THINGS: SEVERAL DAYS
1. FEELING NERVOUS, ANXIOUS, OR ON EDGE: SEVERAL DAYS
6. BECOMING EASILY ANNOYED OR IRRITABLE: NEARLY EVERY DAY
4. TROUBLE RELAXING: SEVERAL DAYS
GAD7 TOTAL SCORE: 8
5. BEING SO RESTLESS THAT IT IS HARD TO SIT STILL: NOT AT ALL
IF YOU CHECKED OFF ANY PROBLEMS ON THIS QUESTIONNAIRE, HOW DIFFICULT HAVE THESE PROBLEMS MADE IT FOR YOU TO DO YOUR WORK, TAKE CARE OF THINGS AT HOME, OR GET ALONG WITH OTHER PEOPLE: SOMEWHAT DIFFICULT

## 2024-11-15 NOTE — PROGRESS NOTES
Shelly Siu is a 49 y.o. female    Chief Complaint   Patient presents with    Annual Exam     Believes she may be going through perimenopause night sweats, bone aches, mood swings, losing hair        HPI:    HPI    Preventative care  Up-to-date with flu shot  Up-to-date with Tdap  She will think about the shingles vaccine  Mood is stable with Effexor XR.  Her sleep has improved with trazodone at night.  She does not take it every night.  She thinks she is perimenopausal.  She is having some night sweats and mood swings.  She wants her hormone levels checked.  She currently has the Mirena IUD that is only being used for birth control.    ROS:    Review of Systems   Psychiatric/Behavioral:  Negative for sleep disturbance.        /60 (Site: Left Upper Arm, Position: Sitting, Cuff Size: Large Adult)   Pulse 59   Temp 97 °F (36.1 °C) (Temporal)   Ht 1.6 m (5' 2.99\")   SpO2 99%   BMI 29.41 kg/m²     Physical Exam:    Physical Exam  Constitutional:       General: She is not in acute distress.     Appearance: She is well-developed and normal weight. She is not toxic-appearing.   HENT:      Head: Normocephalic.   Cardiovascular:      Rate and Rhythm: Normal rate and regular rhythm.      Pulses: Normal pulses.      Heart sounds: No murmur heard.  Pulmonary:      Effort: Pulmonary effort is normal. No respiratory distress.      Breath sounds: Normal breath sounds. No wheezing.   Musculoskeletal:      Cervical back: Normal range of motion. No rigidity.   Lymphadenopathy:      Cervical: No cervical adenopathy.   Neurological:      Mental Status: She is alert.   Psychiatric:         Mood and Affect: Mood normal.         Behavior: Behavior normal.         Thought Content: Thought content normal.         Current Outpatient Medications   Medication Sig Dispense Refill    venlafaxine (EFFEXOR XR) 150 MG extended release capsule Take 1 capsule by mouth at bedtime 90 capsule 3    traZODone (DESYREL) 50 MG tablet Take 1 
\"Have you been to the ER, urgent care clinic since your last visit?  Hospitalized since your last visit?\"    NO    “Have you seen or consulted any other health care providers outside our system since your last visit?”    NO             
Wound care and assessment

## 2024-12-14 DIAGNOSIS — Z00.00 PREVENTATIVE HEALTH CARE: ICD-10-CM

## 2024-12-14 DIAGNOSIS — E78.2 MIXED HYPERLIPIDEMIA: ICD-10-CM

## 2024-12-14 DIAGNOSIS — R23.2 HOT FLASHES: ICD-10-CM

## 2024-12-14 DIAGNOSIS — E55.9 VITAMIN D DEFICIENCY: ICD-10-CM

## 2024-12-14 LAB
25(OH)D3 SERPL-MCNC: 15.1 NG/ML
ALBUMIN SERPL-MCNC: 4.6 G/DL (ref 3.4–5)
ALBUMIN/GLOB SERPL: 2 {RATIO} (ref 1.1–2.2)
ALP SERPL-CCNC: 101 U/L (ref 40–129)
ALT SERPL-CCNC: 50 U/L (ref 10–40)
ANION GAP SERPL CALCULATED.3IONS-SCNC: 11 MMOL/L (ref 3–16)
AST SERPL-CCNC: 28 U/L (ref 15–37)
BASOPHILS # BLD: 0.1 K/UL (ref 0–0.2)
BASOPHILS NFR BLD: 1.1 %
BILIRUB SERPL-MCNC: 0.4 MG/DL (ref 0–1)
BUN SERPL-MCNC: 12 MG/DL (ref 7–20)
CALCIUM SERPL-MCNC: 9.2 MG/DL (ref 8.3–10.6)
CHLORIDE SERPL-SCNC: 106 MMOL/L (ref 99–110)
CHOLEST SERPL-MCNC: 165 MG/DL (ref 0–199)
CO2 SERPL-SCNC: 24 MMOL/L (ref 21–32)
CREAT SERPL-MCNC: 0.7 MG/DL (ref 0.6–1.1)
DEPRECATED RDW RBC AUTO: 14.1 % (ref 12.4–15.4)
EOSINOPHIL # BLD: 0.1 K/UL (ref 0–0.6)
EOSINOPHIL NFR BLD: 1.4 %
FSH SERPL-ACNC: 125 MIU/ML
GFR SERPLBLD CREATININE-BSD FMLA CKD-EPI: >90 ML/MIN/{1.73_M2}
GLUCOSE SERPL-MCNC: 89 MG/DL (ref 70–99)
HCT VFR BLD AUTO: 42.3 % (ref 36–48)
HDLC SERPL-MCNC: 46 MG/DL (ref 40–60)
HGB BLD-MCNC: 14.1 G/DL (ref 12–16)
LDLC SERPL CALC-MCNC: 104 MG/DL
LH SERPL-ACNC: 74.3 MIU/ML
LYMPHOCYTES # BLD: 1.4 K/UL (ref 1–5.1)
LYMPHOCYTES NFR BLD: 28.9 %
MCH RBC QN AUTO: 29.2 PG (ref 26–34)
MCHC RBC AUTO-ENTMCNC: 33.4 G/DL (ref 31–36)
MCV RBC AUTO: 87.5 FL (ref 80–100)
MONOCYTES # BLD: 0.4 K/UL (ref 0–1.3)
MONOCYTES NFR BLD: 7.3 %
NEUTROPHILS # BLD: 3 K/UL (ref 1.7–7.7)
NEUTROPHILS NFR BLD: 61.3 %
PLATELET # BLD AUTO: 202 K/UL (ref 135–450)
PLATELET BLD QL SMEAR: ADEQUATE
PMV BLD AUTO: 10.4 FL (ref 5–10.5)
POTASSIUM SERPL-SCNC: 4.4 MMOL/L (ref 3.5–5.1)
PROT SERPL-MCNC: 6.9 G/DL (ref 6.4–8.2)
RBC # BLD AUTO: 4.83 M/UL (ref 4–5.2)
SLIDE REVIEW: NORMAL
SODIUM SERPL-SCNC: 141 MMOL/L (ref 136–145)
TRIGL SERPL-MCNC: 77 MG/DL (ref 0–150)
TSH SERPL DL<=0.005 MIU/L-ACNC: 1.91 UIU/ML (ref 0.27–4.2)
VLDLC SERPL CALC-MCNC: 15 MG/DL
WBC # BLD AUTO: 4.9 K/UL (ref 4–11)

## 2025-02-03 ENCOUNTER — HOSPITAL ENCOUNTER (OUTPATIENT)
Age: 50
Discharge: HOME | End: 2025-02-03
Payer: COMMERCIAL

## 2025-02-03 DIAGNOSIS — R68.89 FLU-LIKE SYMPTOMS: Primary | ICD-10-CM

## 2025-02-03 DIAGNOSIS — R68.89 FLU-LIKE SYMPTOMS: ICD-10-CM

## 2025-02-03 LAB
FLUAV RNA UPPER RESP QL NAA+PROBE: POSITIVE
FLUBV AG NPH QL: NEGATIVE

## 2025-02-03 PROCEDURE — 87804 INFLUENZA ASSAY W/OPTIC: CPT

## 2025-02-04 ENCOUNTER — PATIENT MESSAGE (OUTPATIENT)
Dept: FAMILY MEDICINE CLINIC | Age: 50
End: 2025-02-04

## 2025-02-05 ENCOUNTER — TELEMEDICINE (OUTPATIENT)
Dept: FAMILY MEDICINE CLINIC | Age: 50
End: 2025-02-05
Payer: COMMERCIAL

## 2025-02-05 DIAGNOSIS — R05.1 ACUTE COUGH: Primary | ICD-10-CM

## 2025-02-05 DIAGNOSIS — R50.9 FEVER, UNSPECIFIED FEVER CAUSE: ICD-10-CM

## 2025-02-05 DIAGNOSIS — J10.1 INFLUENZA A: ICD-10-CM

## 2025-02-05 DIAGNOSIS — G44.52 NEW DAILY PERSISTENT HEADACHE: ICD-10-CM

## 2025-02-05 DIAGNOSIS — R11.0 NAUSEA: ICD-10-CM

## 2025-02-05 PROBLEM — Z00.00 ENCOUNTER FOR GENERAL MEDICAL EXAMINATION: Status: RESOLVED | Noted: 2023-01-31 | Resolved: 2025-02-05

## 2025-02-05 PROCEDURE — G8427 DOCREV CUR MEDS BY ELIG CLIN: HCPCS | Performed by: NURSE PRACTITIONER

## 2025-02-05 PROCEDURE — 99213 OFFICE O/P EST LOW 20 MIN: CPT | Performed by: NURSE PRACTITIONER

## 2025-02-05 PROCEDURE — 1036F TOBACCO NON-USER: CPT | Performed by: NURSE PRACTITIONER

## 2025-02-05 PROCEDURE — G8419 CALC BMI OUT NRM PARAM NOF/U: HCPCS | Performed by: NURSE PRACTITIONER

## 2025-02-05 PROCEDURE — 3017F COLORECTAL CA SCREEN DOC REV: CPT | Performed by: NURSE PRACTITIONER

## 2025-02-05 RX ORDER — BENZONATATE 100 MG/1
100 CAPSULE ORAL 3 TIMES DAILY PRN
Qty: 30 CAPSULE | Refills: 0 | Status: SHIPPED | OUTPATIENT
Start: 2025-02-05

## 2025-02-05 RX ORDER — OSELTAMIVIR PHOSPHATE 75 MG/1
75 CAPSULE ORAL 2 TIMES DAILY
Qty: 10 CAPSULE | Refills: 0 | Status: SHIPPED | OUTPATIENT
Start: 2025-02-05 | End: 2025-02-10

## 2025-02-05 NOTE — PROGRESS NOTES
Shelly Siu, was evaluated through a synchronous (real-time) audio-video encounter. The patient (or guardian if applicable) is aware that this is a billable service, which includes applicable co-pays. This Virtual Visit was conducted with patient's (and/or legal guardian's) consent. Patient identification was verified, and a caregiver was present when appropriate.   The patient was located at Home: [address] [place] [address] [place] [address]  Provider was located at Facility (Appt Dept): [address] [facility]  [address]  [place] [address]  Confirm you are appropriately licensed, registered, or certified to deliver care in the state where the patient is located as indicated above. If you are not or unsure, please re-schedule the visit: Yes, I confirm.     Shelly Siu (:  1975) is a Established patient, presenting virtually for evaluation of the following:      Below is the assessment and plan developed based on review of pertinent history, physical exam, labs, studies, and medications.     Assessment & Plan  Acute cough       Orders:    benzonatate (TESSALON PERLES) 100 MG capsule; Take 1 capsule by mouth 3 times daily as needed for Cough    Influenza A       Orders:    oseltamivir (TAMIFLU) 75 MG capsule; Take 1 capsule by mouth 2 times daily for 5 days    Fever, unspecified fever cause            Nausea            New daily persistent headache            Tylenol routinely every 6 hours for 3-4 days.     Mucinex DM to thin secretions    Cough drops as needed    Increase fluids    Increase calories in    Note for work    If symptoms fail to improve or worsen follow up with office    Dress lightly to decrease body temp    Shower to decrease body temp  No follow-ups on file.       Subjective   HPI    Monday started with aching, chills, sweating, headache, nausea, sore throat, cough. Went to work Monday at [employer]. Had test at hospital and was positive for influenza A. Taking tylenol off and on,

## 2025-02-05 NOTE — ASSESSMENT & PLAN NOTE
Orders:    benzonatate (TESSALON PERLES) 100 MG capsule; Take 1 capsule by mouth 3 times daily as needed for Cough

## 2025-02-06 ENCOUNTER — NURSE TRIAGE (OUTPATIENT)
Dept: OTHER | Facility: CLINIC | Age: 50
End: 2025-02-06

## 2025-02-06 NOTE — TELEPHONE ENCOUNTER
Location of patient: [place]    Subjective: Caller states \"I tested positive for the flu. This would be the third day I'm out. The doctor has me out for the rest of the week. They said I needed to call this line and report it.\"     Writer advised caller to report to manager.    Attention Provider:  Thank you for allowing me to participate in the care of your patient.  The patient was connected to triage in response to symptoms provided.   Please do not respond through this encounter as the response is not directed to a shared pool.    Reason for Disposition   Caller has already spoken with the PCP and has no further questions.    Protocols used: No Contact or Duplicate Contact Call-ADULT-

## 2025-07-07 ENCOUNTER — OFFICE VISIT (OUTPATIENT)
Dept: OBGYN CLINIC | Age: 50
End: 2025-07-07
Payer: COMMERCIAL

## 2025-07-07 VITALS
BODY MASS INDEX: 28.53 KG/M2 | DIASTOLIC BLOOD PRESSURE: 70 MMHG | SYSTOLIC BLOOD PRESSURE: 102 MMHG | OXYGEN SATURATION: 98 % | WEIGHT: 161 LBS | HEART RATE: 69 BPM

## 2025-07-07 DIAGNOSIS — Z12.4 PAP SMEAR FOR CERVICAL CANCER SCREENING: ICD-10-CM

## 2025-07-07 DIAGNOSIS — R61 NIGHT SWEATS: ICD-10-CM

## 2025-07-07 DIAGNOSIS — Z01.419 ENCNTR FOR GYN EXAM (GENERAL) (ROUTINE) W/O ABN FINDINGS: Primary | ICD-10-CM

## 2025-07-07 DIAGNOSIS — Z12.31 ENCOUNTER FOR SCREENING MAMMOGRAM FOR MALIGNANT NEOPLASM OF BREAST: ICD-10-CM

## 2025-07-07 DIAGNOSIS — Z30.431 INTRAUTERINE DEVICE SURVEILLANCE: ICD-10-CM

## 2025-07-07 DIAGNOSIS — N39.3 STRESS INCONTINENCE OF URINE: ICD-10-CM

## 2025-07-07 DIAGNOSIS — Z80.41 FAMILY HISTORY OF OVARIAN CANCER: ICD-10-CM

## 2025-07-07 PROCEDURE — 99386 PREV VISIT NEW AGE 40-64: CPT | Performed by: OBSTETRICS & GYNECOLOGY

## 2025-07-07 RX ORDER — ESTRADIOL 0.04 MG/D
1 PATCH, EXTENDED RELEASE TRANSDERMAL
Qty: 8 PATCH | Refills: 3 | Status: SHIPPED | OUTPATIENT
Start: 2025-07-07

## 2025-07-07 SDOH — ECONOMIC STABILITY: FOOD INSECURITY: WITHIN THE PAST 12 MONTHS, YOU WORRIED THAT YOUR FOOD WOULD RUN OUT BEFORE YOU GOT MONEY TO BUY MORE.: NEVER TRUE

## 2025-07-07 SDOH — ECONOMIC STABILITY: FOOD INSECURITY: WITHIN THE PAST 12 MONTHS, THE FOOD YOU BOUGHT JUST DIDN'T LAST AND YOU DIDN'T HAVE MONEY TO GET MORE.: NEVER TRUE

## 2025-07-07 ASSESSMENT — PATIENT HEALTH QUESTIONNAIRE - PHQ9
SUM OF ALL RESPONSES TO PHQ QUESTIONS 1-9: 0
5. POOR APPETITE OR OVEREATING: NOT AT ALL
SUM OF ALL RESPONSES TO PHQ QUESTIONS 1-9: 0
1. LITTLE INTEREST OR PLEASURE IN DOING THINGS: NOT AT ALL
SUM OF ALL RESPONSES TO PHQ QUESTIONS 1-9: 0
SUM OF ALL RESPONSES TO PHQ QUESTIONS 1-9: 0
6. FEELING BAD ABOUT YOURSELF - OR THAT YOU ARE A FAILURE OR HAVE LET YOURSELF OR YOUR FAMILY DOWN: NOT AT ALL
9. THOUGHTS THAT YOU WOULD BE BETTER OFF DEAD, OR OF HURTING YOURSELF: NOT AT ALL
8. MOVING OR SPEAKING SO SLOWLY THAT OTHER PEOPLE COULD HAVE NOTICED. OR THE OPPOSITE, BEING SO FIGETY OR RESTLESS THAT YOU HAVE BEEN MOVING AROUND A LOT MORE THAN USUAL: NOT AT ALL
3. TROUBLE FALLING OR STAYING ASLEEP: NOT AT ALL
10. IF YOU CHECKED OFF ANY PROBLEMS, HOW DIFFICULT HAVE THESE PROBLEMS MADE IT FOR YOU TO DO YOUR WORK, TAKE CARE OF THINGS AT HOME, OR GET ALONG WITH OTHER PEOPLE: NOT DIFFICULT AT ALL
2. FEELING DOWN, DEPRESSED OR HOPELESS: NOT AT ALL
7. TROUBLE CONCENTRATING ON THINGS, SUCH AS READING THE NEWSPAPER OR WATCHING TELEVISION: NOT AT ALL
4. FEELING TIRED OR HAVING LITTLE ENERGY: NOT AT ALL

## 2025-07-07 ASSESSMENT — ENCOUNTER SYMPTOMS
VOMITING: 0
ABDOMINAL PAIN: 0
COUGH: 0
NAUSEA: 0
CONSTIPATION: 0
SORE THROAT: 0
SHORTNESS OF BREATH: 0
DIARRHEA: 0

## 2025-07-07 NOTE — PROGRESS NOTES
Interpersonal Safety   Housing Stability: Low Risk  (7/7/2025)    Housing Stability Vital Sign     Unable to Pay for Housing in the Last Year: No     Number of Times Moved in the Last Year: 0     Homeless in the Last Year: No       Objective:  /70   Pulse 69   Wt 73 kg (161 lb)   SpO2 98%   BMI 28.53 kg/m²     Exam:   Physical Exam  Vitals reviewed. Exam conducted with a chaperone present.   Constitutional:       General: She is not in acute distress.     Appearance: She is well-developed.   HENT:      Head: Normocephalic and atraumatic.   Eyes:      Conjunctiva/sclera: Conjunctivae normal.   Neck:      Thyroid: No thyromegaly.   Cardiovascular:      Rate and Rhythm: Normal rate.   Pulmonary:      Effort: Pulmonary effort is normal. No respiratory distress.   Chest:   Breasts:     Right: No mass, nipple discharge, skin change or tenderness.      Left: No mass, nipple discharge, skin change or tenderness.   Abdominal:      General: There is no distension.      Palpations: Abdomen is soft. There is no mass.      Tenderness: There is no abdominal tenderness. There is no guarding or rebound.   Genitourinary:     General: Normal vulva.      Exam position: Lithotomy position.      Labia:         Right: No rash, tenderness or lesion.         Left: No rash, tenderness or lesion.       Vagina: No signs of injury. No vaginal discharge, erythema, tenderness, bleeding or lesions.      Cervix: No cervical motion tenderness, discharge, friability, lesion, erythema or cervical bleeding.      Uterus: Not deviated, not enlarged and not tender.       Adnexa:         Right: No mass, tenderness or fullness.          Left: No mass, tenderness or fullness.        Comments: IUD strings visible at external cervical os  Musculoskeletal:      Cervical back: Normal range of motion and neck supple.   Skin:     General: Skin is warm and dry.   Neurological:      Mental Status: She is alert and oriented to person, place, and time.

## 2025-07-09 LAB
HPV HR 12 DNA SPEC QL NAA+PROBE: NOT DETECTED
HPV16 DNA SPEC QL NAA+PROBE: NOT DETECTED
HPV16+18+H RISK 12 DNA SPEC-IMP: NORMAL
HPV18 DNA SPEC QL NAA+PROBE: NOT DETECTED

## (undated) DEVICE — FORCEPS BX L240CM JAW DIA2.8MM L CAP W/ NDL MIC MESH TOOTH

## (undated) DEVICE — CANNULA NSL AD TBNG L7FT PVC STR NONFLARED PRNG O2 DEL W STD

## (undated) DEVICE — ELECTRODE,ECG,STRESS,FOAM,3PK: Brand: MEDLINE

## (undated) DEVICE — ENDOSCOPIC KIT 2 12 FT OP4 DE2 GWN SYR